# Patient Record
Sex: MALE | Race: WHITE | NOT HISPANIC OR LATINO | Employment: FULL TIME | URBAN - METROPOLITAN AREA
[De-identification: names, ages, dates, MRNs, and addresses within clinical notes are randomized per-mention and may not be internally consistent; named-entity substitution may affect disease eponyms.]

---

## 2021-05-10 ENCOUNTER — OFFICE VISIT (OUTPATIENT)
Dept: FAMILY MEDICINE | Facility: CLINIC | Age: 31
End: 2021-05-10
Payer: COMMERCIAL

## 2021-05-10 VITALS
BODY MASS INDEX: 26.66 KG/M2 | HEIGHT: 73 IN | SYSTOLIC BLOOD PRESSURE: 118 MMHG | WEIGHT: 201.12 LBS | DIASTOLIC BLOOD PRESSURE: 72 MMHG | HEART RATE: 66 BPM | RESPIRATION RATE: 16 BRPM | TEMPERATURE: 99 F

## 2021-05-10 DIAGNOSIS — Z11.59 ENCOUNTER FOR HEPATITIS C SCREENING TEST FOR LOW RISK PATIENT: ICD-10-CM

## 2021-05-10 DIAGNOSIS — Z13.6 SCREENING FOR CARDIOVASCULAR CONDITION: ICD-10-CM

## 2021-05-10 DIAGNOSIS — J30.2 SEASONAL ALLERGIC RHINITIS, UNSPECIFIED TRIGGER: ICD-10-CM

## 2021-05-10 DIAGNOSIS — Z00.00 ROUTINE GENERAL MEDICAL EXAMINATION AT A HEALTH CARE FACILITY: Primary | ICD-10-CM

## 2021-05-10 DIAGNOSIS — K21.9 GASTROESOPHAGEAL REFLUX DISEASE, UNSPECIFIED WHETHER ESOPHAGITIS PRESENT: ICD-10-CM

## 2021-05-10 DIAGNOSIS — Z11.4 SCREENING FOR HIV WITHOUT PRESENCE OF RISK FACTORS: ICD-10-CM

## 2021-05-10 DIAGNOSIS — Z13.1 SCREENING FOR DIABETES MELLITUS: ICD-10-CM

## 2021-05-10 PROCEDURE — 99385 PREV VISIT NEW AGE 18-39: CPT | Performed by: FAMILY MEDICINE

## 2021-05-10 RX ORDER — LORATADINE 10 MG/1
10 TABLET ORAL DAILY
Refills: 0 | COMMUNITY
Start: 2021-05-10 | End: 2024-02-28

## 2021-05-10 RX ORDER — FAMOTIDINE 20 MG/1
20 TABLET, FILM COATED ORAL DAILY PRN
COMMUNITY
Start: 2021-05-10

## 2021-05-10 SDOH — HEALTH STABILITY: MENTAL HEALTH: HOW OFTEN DO YOU HAVE 6 OR MORE DRINKS ON ONE OCCASION?: NEVER

## 2021-05-10 SDOH — HEALTH STABILITY: MENTAL HEALTH: HOW MANY STANDARD DRINKS CONTAINING ALCOHOL DO YOU HAVE ON A TYPICAL DAY?: 1 OR 2

## 2021-05-10 SDOH — HEALTH STABILITY: MENTAL HEALTH: HOW OFTEN DO YOU HAVE A DRINK CONTAINING ALCOHOL?: 2-4 TIMES A MONTH

## 2021-05-10 ASSESSMENT — ENCOUNTER SYMPTOMS
DIZZINESS: 0
SHORTNESS OF BREATH: 1
SORE THROAT: 0
COUGH: 1
HEARTBURN: 0
HEADACHES: 0
DIARRHEA: 0
DYSURIA: 0
FEVER: 0
NERVOUS/ANXIOUS: 0
WEAKNESS: 0
FREQUENCY: 0
MYALGIAS: 0
PALPITATIONS: 0
CONSTIPATION: 0
EYE PAIN: 0
NAUSEA: 0
HEMATOCHEZIA: 0
PARESTHESIAS: 0
JOINT SWELLING: 0
CHILLS: 0
ARTHRALGIAS: 0
HEMATURIA: 0
ABDOMINAL PAIN: 0

## 2021-05-10 ASSESSMENT — MIFFLIN-ST. JEOR: SCORE: 1918.21

## 2021-05-10 NOTE — PROGRESS NOTES
Physical-States he has had a cough outside since spring started.   Answers for HPI/ROS submitted by the patient on 5/10/2021   Annual Exam:  Frequency of exercise:: 2-3 days/week  Getting at least 3 servings of Calcium per day:: Yes  Diet:: Vegetarian/vegan  Taking medications regularly:: Yes  Medication side effects:: Not applicable, None  Bi-annual eye exam:: Yes  Dental care twice a year:: Yes  Sleep apnea or symptoms of sleep apnea:: None  abdominal pain: No  Blood in stool: No  Blood in urine: No  chest pain: No  chills: No  congestion: Yes  constipation: No  cough: Yes  diarrhea: No  dizziness: No  ear pain: No  eye pain: No  nervous/anxious: No  fever: No  frequency: No  genital sores: No  headaches: No  hearing loss: No  heartburn: No  arthralgias: No  joint swelling: No  peripheral edema: No  mood changes: No  myalgias: No  nausea: No  dysuria: No  palpitations: No  Skin sensation changes: No  sore throat: No  urgency: No  rash: No  shortness of breath: Yes  visual disturbance: No  weakness: No  impotence: No  penile discharge: No  Additional concerns today:: Yes  Duration of exercise:: 30-45 minutes      3  SUBJECTIVE:   CC: Gerson De Jesus is an 30 year old male who presents for preventive health visit.        Patient has been advised of split billing requirements and indicates understanding: Yes  Healthy Habits:    Do you get at least three servings of calcium containing foods daily (dairy, green leafy vegetables, etc.)? yes    Amount of exercise or daily activities, outside of work: 30-45 minutes per day.    Problems taking medications regularly No    Medication side effects: No    Have you had an eye exam in the past two years? Yes - yesterday.    Do you see a dentist twice per year? yes    Do you have sleep apnea, excessive snoring or daytime drowsiness?no    Today's PHQ-2 Score:   PHQ-2 ( 1999 Pfizer) 5/10/2021   Q1: Little interest or pleasure in doing things 0   Q2: Feeling down, depressed or  hopeless 0   PHQ-2 Score 0   Q1: Little interest or pleasure in doing things Not at all   Q2: Feeling down, depressed or hopeless Not at all   PHQ-2 Score 0     Abuse: Current or Past(Physical, Sexual or Emotional)- No  Do you feel safe in your environment? Yes    Have you ever done Advance Care Planning? (For example, a Health Directive, POLST, or a discussion with a medical provider or your loved ones about your wishes): No, advance care planning information given to patient to review.  Patient declined advance care planning discussion at this time. He will prepare his own.     Social History     Tobacco Use     Smoking status: Never Smoker     Smokeless tobacco: Never Used   Substance Use Topics     Alcohol use: Yes     Frequency: 2-4 times a month     Drinks per session: 1 or 2     Binge frequency: Never     If you drink alcohol do you typically have >3 drinks per day or >7 drinks per week? No                      Last PSA: No results found for: PSA    Reviewed orders with patient. Reviewed health maintenance and updated orders accordingly - Yes       Reviewed and updated as needed this visit by clinical staff    Reviewed and updated as needed this visit by Provider    Some seasonal allergies - take loratidine.     No hospitalization or surgery.   Lives with his wife.   Regular exercise. - weights and roving machine.   Father skin cancer. Mother breast cancer.   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     ROS:  CONSTITUTIONAL: NEGATIVE for fever, chills, change in weight  INTEGUMENTARY/SKIN: NEGATIVE for worrisome rashes, moles or lesions  EYES: NEGATIVE for vision changes or irritation  ENT: NEGATIVE for ear, mouth and throat problems  RESP: NEGATIVE for significant cough or SOB  CV: NEGATIVE for chest pain, palpitations or peripheral edema  GI: NEGATIVE for nausea, abdominal pain, heartburn, or change in bowel habits   male: negative for dysuria, hematuria, decreased urinary stream, erectile dysfunction, urethral  "discharge  MUSCULOSKELETAL: NEGATIVE for significant arthralgias or myalgia  NEURO: NEGATIVE for weakness, dizziness or paresthesias  PSYCHIATRIC: NEGATIVE for changes in mood or affect    OBJECTIVE:   /72 (BP Location: Right arm, Patient Position: Chair, Cuff Size: Adult Regular)   Pulse 66   Temp 99  F (37.2  C)   Resp 16   Ht 1.842 m (6' 0.5\")   Wt 91.2 kg (201 lb 1.9 oz)   BMI 26.90 kg/m    EXAM:  GENERAL: healthy, alert and no distress  EYES: Eyes grossly normal to inspection, PERRL and conjunctivae and sclerae normal  HENT: ear canals and TM's normal, nose and mouth without ulcers or lesions  NECK: no adenopathy, no asymmetry, masses, or scars and thyroid normal to palpation  RESP: lungs clear to auscultation - no rales, rhonchi or wheezes  CV: regular rate and rhythm, normal S1 S2, no S3 or S4, no murmur, click or rub, no peripheral edema and peripheral pulses strong  ABDOMEN: soft, nontender, no hepatosplenomegaly, no masses and bowel sounds normal   (male): normal male genitalia without lesions or urethral discharge, no hernia  MS: no gross musculoskeletal defects noted, no edema  SKIN: no suspicious lesions or rashes  NEURO: Normal strength and tone, mentation intact and speech normal  PSYCH: mentation appears normal, affect normal/bright      ASSESSMENT/PLAN:   1. Routine general medical examination at a health care facility   patient left without labs and tdap- per patient waited for an hour but no follow up when I called. I apologized to him and asked him to schedule future MA only and lab only appt    2. Seasonal allergic rhinitis  Using otc. Not evaluated today.   - loratadine (CLARITIN) 10 MG tablet; Take 1 tablet (10 mg) by mouth daily  Dispense:  ; Refill: 0    3. Esophageal reflux  Using otc. Not evaluated today.   - famotidine (PEPCID) 20 MG tablet; Take 1 tablet (20 mg) by mouth daily as needed    4. Encounter for hepatitis C screening test for low risk patient     - Hepatitis C " "Screen Reflex to HCV RNA Quant and Genotype    5. Screening for HIV without presence of risk factors     - HIV Antigen Antibody Combo    6. Screening for cardiovascular condition     - Lipid panel reflex to direct LDL Fasting    7. Screening for diabetes mellitus     - Glucose    Patient has been advised of split billing requirements and indicates understanding: No  COUNSELING:  Reviewed preventive health counseling, as reflected in patient instructions  Special attention given to:        Regular exercise       Healthy diet/nutrition       Vision screening       Hearing screening    Estimated body mass index is 26.9 kg/m  as calculated from the following:    Height as of this encounter: 1.842 m (6' 0.5\").    Weight as of this encounter: 91.2 kg (201 lb 1.9 oz).    Weight management plan: Discussed healthy diet and exercise guidelines    He reports that he has never smoked. He has never used smokeless tobacco.    Counseling Resources:  ATP IV Guidelines  Pooled Cohorts Equation Calculator  FRAX Risk Assessment  ICSI Preventive Guidelines  Dietary Guidelines for Americans, 2010  USDA's MyPlate  ASA Prophylaxis  Lung CA Screening    Michael Jackson MD  [facility]  "

## 2021-05-10 NOTE — PATIENT INSTRUCTIONS
We are working hard to begin vaccinating more people against COVID-19.   Check Page[employer]ites or go to [email or web address] website to check your eligibility and to schedule vaccine appointment.   Please call [phone] to schedule your covid vaccine if you are unable to schedule it through PageBites.           Did you know?      You can schedule a video visit for follow-up appointments as well as future appointments for certain conditions.  Please see the below link.     [email or web address]    If you have not already done so,  I encourage you to sign up for [employer] ([email or web address]).  This will allow you to review your results, securely communicate with a provider, and schedule virtual visits as well.  Preventive Health Recommendations  Male Ages 26 - 39    Yearly exam:             See your health care provider every year in order to  o   Review health changes.   o   Discuss preventive care.    o   Review your medicines if your doctor has prescribed any.    You should be tested each year for STDs (sexually transmitted diseases), if you re at risk.     After age 35, talk to your provider about cholesterol testing. If you are at risk for heart disease, have your cholesterol tested at least every 5 years.     If you are at risk for diabetes, you should have a diabetes test (fasting glucose).  Shots: Get a flu shot each year. Get a tetanus shot every 10 years.     Nutrition:    Eat at least 5 servings of fruits and vegetables daily.     Eat whole-grain bread, whole-wheat pasta and brown rice instead of white grains and rice.     Get adequate Calcium and Vitamin D.     Lifestyle    Exercise for at least 150 minutes a week (30 minutes a day, 5 days a week). This will help you control your weight and prevent disease.     Limit alcohol to one drink per day.     No smoking.     Wear sunscreen to prevent skin cancer.     See your dentist every six months for an exam and cleaning.

## 2023-08-15 ENCOUNTER — NURSE TRIAGE (OUTPATIENT)
Dept: NURSING | Facility: CLINIC | Age: 33
End: 2023-08-15
Payer: COMMERCIAL

## 2023-08-15 NOTE — TELEPHONE ENCOUNTER
Triage Call:     Pt calling to report that he tested positive for COVID-19 this morning and is calling for treatment  Last night is when his sx started.     Sx: Fatigue, body aches, chills, fever, headache  T: 101.0F    Took Ibuprofen    Pt is reporting that he is having shortness of breath with walking room to room in his house that is new.    Pt states that he does not have a PCP. He plans to go to the [facility] for evaluation due to the change in his breathing.    Pt was given care advice and writer discussed infection control protocol per the CDC since he is positive for COVID-19 and plans to go to a [facility].     Reason for Disposition   MILD difficulty breathing (e.g., minimal/no SOB at rest, SOB with walking, pulse <100)    Additional Information   Negative: SEVERE difficulty breathing (e.g., struggling for each breath, speaks in single words)   Negative: Difficult to awaken or acting confused (e.g., disoriented, slurred speech)   Negative: Bluish (or gray) lips or face now   Negative: Shock suspected (e.g., cold/pale/clammy skin, too weak to stand, low BP, rapid pulse)   Negative: Sounds like a life-threatening emergency to the triager   Negative: SEVERE or constant chest pain or pressure  (Exception: Mild central chest pain, present only when coughing.)   Negative: MODERATE difficulty breathing (e.g., speaks in phrases, SOB even at rest, pulse 100-120)   Negative: Headache and stiff neck (can't touch chin to chest)   Negative: Oxygen level (e.g., pulse oximetry) 90 percent or lower   Negative: Patient sounds very sick or weak to the triager   Negative: Chest pain or pressure  (Exception: MILD central chest pain, present only when coughing)   Negative: [1] Diagnosed or suspected COVID-19 AND [2] symptoms lasting 3 or more weeks   Negative: [1] COVID-19 exposure AND [2] no symptoms   Negative: COVID-19 vaccine reaction suspected (e.g., fever, headache, muscle aches) occurring 1 to 3 days after getting vaccine    Negative: COVID-19 vaccine, questions about   Negative: [1] Lives with someone known to have influenza (flu test positive) AND [2] flu-like symptoms (e.g., cough, runny nose, sore throat, SOB; with or without fever)   Negative: [1] Adult with possible COVID-19 symptoms AND [2] triager concerned about severity of symptoms or other causes   Negative: COVID-19 and breastfeeding, questions about    Protocols used: Coronavirus (COVID-19) Diagnosed or [id number]  Pat Becerra RN  [facility] Nurse Advisor 11:27 AM 8/15/2023

## 2024-02-28 ENCOUNTER — OFFICE VISIT (OUTPATIENT)
Dept: FAMILY MEDICINE | Facility: CLINIC | Age: 34
End: 2024-02-28
Payer: COMMERCIAL

## 2024-02-28 VITALS
DIASTOLIC BLOOD PRESSURE: 72 MMHG | WEIGHT: 196 LBS | OXYGEN SATURATION: 97 % | HEIGHT: 72 IN | RESPIRATION RATE: 16 BRPM | HEART RATE: 83 BPM | TEMPERATURE: 98.1 F | SYSTOLIC BLOOD PRESSURE: 110 MMHG | BODY MASS INDEX: 26.55 KG/M2

## 2024-02-28 DIAGNOSIS — Z23 ENCOUNTER FOR IMMUNIZATION: ICD-10-CM

## 2024-02-28 DIAGNOSIS — R05.3 CHRONIC COUGH: ICD-10-CM

## 2024-02-28 DIAGNOSIS — Z00.00 ROUTINE GENERAL MEDICAL EXAMINATION AT A HEALTH CARE FACILITY: Primary | ICD-10-CM

## 2024-02-28 LAB
ERYTHROCYTE [DISTWIDTH] IN BLOOD BY AUTOMATED COUNT: 11.8 % (ref 10–15)
HCT VFR BLD AUTO: 46.3 % (ref 40–53)
HGB BLD-MCNC: 16.4 G/DL (ref 13.3–17.7)
MCH RBC QN AUTO: 29.5 PG (ref 26.5–33)
MCHC RBC AUTO-ENTMCNC: 35.4 G/DL (ref 31.5–36.5)
MCV RBC AUTO: 83 FL (ref 78–100)
PLATELET # BLD AUTO: 255 10E3/UL (ref 150–450)
RBC # BLD AUTO: 5.55 10E6/UL (ref 4.4–5.9)
WBC # BLD AUTO: 5 10E3/UL (ref 4–11)

## 2024-02-28 PROCEDURE — 99395 PREV VISIT EST AGE 18-39: CPT | Mod: 25 | Performed by: PHYSICIAN ASSISTANT

## 2024-02-28 PROCEDURE — 80061 LIPID PANEL: CPT | Performed by: PHYSICIAN ASSISTANT

## 2024-02-28 PROCEDURE — 99213 OFFICE O/P EST LOW 20 MIN: CPT | Mod: 25 | Performed by: PHYSICIAN ASSISTANT

## 2024-02-28 PROCEDURE — 90715 TDAP VACCINE 7 YRS/> IM: CPT | Performed by: PHYSICIAN ASSISTANT

## 2024-02-28 PROCEDURE — 36415 COLL VENOUS BLD VENIPUNCTURE: CPT | Performed by: PHYSICIAN ASSISTANT

## 2024-02-28 PROCEDURE — 90471 IMMUNIZATION ADMIN: CPT | Performed by: PHYSICIAN ASSISTANT

## 2024-02-28 PROCEDURE — 80048 BASIC METABOLIC PNL TOTAL CA: CPT | Performed by: PHYSICIAN ASSISTANT

## 2024-02-28 PROCEDURE — 85027 COMPLETE CBC AUTOMATED: CPT | Performed by: PHYSICIAN ASSISTANT

## 2024-02-28 RX ORDER — OMEPRAZOLE 10 MG/1
20 CAPSULE, DELAYED RELEASE ORAL DAILY
COMMUNITY

## 2024-02-28 RX ORDER — CALCIUM CARBONATE 500 MG/1
500 TABLET, CHEWABLE ORAL DAILY
COMMUNITY

## 2024-02-28 SDOH — HEALTH STABILITY: PHYSICAL HEALTH: ON AVERAGE, HOW MANY DAYS PER WEEK DO YOU ENGAGE IN MODERATE TO STRENUOUS EXERCISE (LIKE A BRISK WALK)?: 7 DAYS

## 2024-02-28 ASSESSMENT — PATIENT HEALTH QUESTIONNAIRE - PHQ9
10. IF YOU CHECKED OFF ANY PROBLEMS, HOW DIFFICULT HAVE THESE PROBLEMS MADE IT FOR YOU TO DO YOUR WORK, TAKE CARE OF THINGS AT HOME, OR GET ALONG WITH OTHER PEOPLE: SOMEWHAT DIFFICULT
SUM OF ALL RESPONSES TO PHQ QUESTIONS 1-9: 10
SUM OF ALL RESPONSES TO PHQ QUESTIONS 1-9: 10

## 2024-02-28 ASSESSMENT — SOCIAL DETERMINANTS OF HEALTH (SDOH): HOW OFTEN DO YOU GET TOGETHER WITH FRIENDS OR RELATIVES?: ONCE A WEEK

## 2024-02-28 NOTE — NURSING NOTE
Prior to immunization administration, verified patients identity using patient s name and date of birth. Please see Immunization Activity for additional information.     Screening Questionnaire for Adult Immunization    Are you sick today?   No   Do you have allergies to medications, food, a vaccine component or latex?   No   Have you ever had a serious reaction after receiving a vaccination?   No   Do you have a long-term health problem with heart, lung, kidney, or metabolic disease (e.g., diabetes), asthma, a blood disorder, no spleen, complement component deficiency, a cochlear implant, or a spinal fluid leak?  Are you on long-term aspirin therapy?   No   Do you have cancer, leukemia, HIV/AIDS, or any other immune system problem?   No   Do you have a parent, brother, or sister with an immune system problem?   Yes   In the past 3 months, have you taken medications that affect  your immune system, such as prednisone, other steroids, or anticancer drugs; drugs for the treatment of rheumatoid arthritis, Crohn s disease, or psoriasis; or have you had radiation treatments?   No   Have you had a seizure, or a brain or other nervous system problem?   No   During the past year, have you received a transfusion of blood or blood    products, or been given immune (gamma) globulin or antiviral drug?   No   For women: Are you pregnant or is there a chance you could become       pregnant during the next month?   No   Have you received any vaccinations in the past 4 weeks?   No     Immunization questionnaire was positive for at least one answer.  Notified Jada.      Patient instructed to remain in clinic for 15 minutes afterwards, and to report any adverse reactions.     Screening performed by Breezy Zavala CMA on 2/28/2024 at 9:33 AM.

## 2024-02-28 NOTE — PROGRESS NOTES
"Preventive Care Visit  [facility]  Cara Elias PA-C, Physician Assistant - Medical  Feb 28, 2024    Assessment & Plan     Routine general medical examination at a health care facility  - REVIEW OF HEALTH MAINTENANCE PROTOCOL ORDERS  - CBC with platelets  - Basic metabolic panel  - Lipid panel reflex to direct LDL Fasting    Chronic cough - pt already trialing treatment for possible GERD, agree with this plan. He will continue omeprazole and if symptoms resolve while on tx but return after finishing 30 day course, he will reach out and I will send H2 blocker for daily maintenance. If no improvement with PPI, then he will reach out and I will send allergy tx.    Encounter for immunization  - TDAP 10-64Y (ADACEL,BOOSTRIX)      BMI  Estimated body mass index is 26.93 kg/m  as calculated from the following:    Height as of this encounter: 1.817 m (5' 11.54\").    Weight as of this encounter: 88.9 kg (196 lb).     Counseling  Appropriate preventive services were discussed with this patient, including applicable screening as appropriate for fall prevention, nutrition, physical activity, Tobacco-use cessation, weight loss and cognition.  Checklist reviewing preventive services available has been given to the patient.  Reviewed patient's diet, addressing concerns and/or questions.   The patient's PHQ-9 score is consistent with moderate depression. He was provided with information regarding depression.         Subjective   Gerson is a 33 year old, presenting for the following:  Physical        2/28/2024     8:49 AM   Additional Questions   Roomed by NYDIA KAMARA      Gerson here today for RHM    AM cough productive of clear sputum ongoing for 1 year  Notes he has stomach acid issues as well as lives with a dog - no history of allergies    Health Care Directive  Patient does not have a Health Care Directive or Living Will: Discussed advance care planning with patient; information given to patient to " review.    HPI      2/28/2024   General Health   How would you rate your overall physical health? Good   Feel stress (tense, anxious, or unable to sleep) To some extent   (!) STRESS CONCERN      2/28/2024   Nutrition   Three or more servings of calcium each day? Yes   Diet: Vegetarian/vegan    Breakfast skipped    Other   If other, please elaborate: dairy free   How many servings of fruit and vegetables per day? 4 or more   How many sweetened beverages each day? 0-1         2/28/2024   Exercise   Days per week of moderate/strenous exercise 7 days         2/28/2024   Social Factors   Frequency of gathering with friends or relatives Once a week   Worry food won't last until get money to buy more No   Food not last or not have enough money for food? No   Do you have housing?  Yes   Are you worried about losing your housing? No   Lack of transportation? No   Unable to get utilities (heat,electricity)? No         2/28/2024   Dental   Dentist two times every year? Yes         2/28/2024   TB Screening   Were you born outside of [place]?  No       Today's PHQ-9 Score:       2/28/2024     8:44 AM   PHQ-9 SCORE   PHQ-9 Total Score MyChart 10 (Moderate depression)   PHQ-9 Total Score 10         2/28/2024   Substance Use   Alcohol more than 3/day or more than 7/wk No   Do you use any other substances recreationally? (!) ALCOHOL     Social History     Tobacco Use    Smoking status: Never    Smokeless tobacco: Never   Vaping Use    Vaping Use: Never used   Substance Use Topics    Alcohol use: Yes    Drug use: Never             2/28/2024   One time HIV Screening   Previous HIV test? No         2/28/2024   STI Screening   New sexual partner(s) since last STI/HIV test? No         2/28/2024   Contraception/Family Planning   Questions about contraception or family planning No        Reviewed and updated as needed this visit by Provider   Tobacco   Meds  Problems  Med Hx  Surg Hx  Fam Hx          Objective    Exam  /72 (BP  "Location: Right arm, Patient Position: Sitting, Cuff Size: Adult Large)   Pulse 83   Temp 98.1  F (36.7  C) (Temporal)   Resp 16   Ht 1.817 m (5' 11.54\")   Wt 88.9 kg (196 lb)   SpO2 97%   BMI 26.93 kg/m     Estimated body mass index is 26.93 kg/m  as calculated from the following:    Height as of this encounter: 1.817 m (5' 11.54\").    Weight as of this encounter: 88.9 kg (196 lb).    Physical Exam  GENERAL: alert and no distress  EYES: Eyes grossly normal to inspection, PERRL and conjunctivae and sclerae normal  HENT: ear canals and TM's normal, nose and mouth without ulcers or lesions  NECK: no adenopathy, no asymmetry, masses, or scars  RESP: lungs clear to auscultation - no rales, rhonchi or wheezes  CV: regular rate and rhythm, normal S1 S2, no S3 or S4, no murmur, click or rub, no peripheral edema  ABDOMEN: soft, nontender, no hepatosplenomegaly, no masses and bowel sounds normal  MS: no gross musculoskeletal defects noted, no edema  SKIN: no suspicious lesions or rashes  NEURO: Normal strength and tone, mentation intact and speech normal  PSYCH: mentation appears normal, affect normal/bright      Signed Electronically by: Cara Elias PA-C    Answers submitted by the patient for this visit:  Patient Health Questionnaire (Submitted on 2/28/2024)  If you checked off any problems, how difficult have these problems made it for you to do your work, take care of things at home, or get along with other people?: Somewhat difficult  PHQ9 TOTAL SCORE: 10    "

## 2024-02-28 NOTE — PATIENT INSTRUCTIONS
Preventive Care Advice   This is general advice given by our system to help you stay healthy. However, your care team may have specific advice just for you. Please talk to your care team about your preventive care needs.  Nutrition  Eat 5 or more servings of fruits and vegetables each day.  Try wheat bread, brown rice and whole grain pasta (instead of white bread, rice, and pasta).  Get enough calcium and vitamin D. Check the label on foods and aim for 100% of the RDA (recommended daily allowance).  Lifestyle  Exercise at least 150 minutes each week   (30 minutes a day, 5 days a week).  Do muscle strengthening activities 2 days a week. These help control your weight and prevent disease.  No smoking.  Wear sunscreen to prevent skin cancer.  Have a dental exam and cleaning every 6 months.  Yearly exams  See your health care team every year to talk about:  Any changes in your health.  Any medicines your care team has prescribed.  Preventive care, family planning, and ways to prevent chronic diseases.  Shots (vaccines)   HPV shots (up to age 26), if you've never had them before.  Hepatitis B shots (up to age 59), if you've never had them before.  COVID-19 shot: Get this shot when it's due.  Flu shot: Get a flu shot every year.  Tetanus shot: Get a tetanus shot every 10 years.  Pneumococcal, hepatitis A, and RSV shots: Ask your care team if you need these based on your risk.  Shingles shot (for age 50 and up).  General health tests  Diabetes screening:  Starting at age 35, Get screened for diabetes at least every 3 years.  If you are younger than age 35, ask your care team if you should be screened for diabetes.  Cholesterol test: At age 39, start having a cholesterol test every 5 years, or more often if advised.  Bone density scan (DEXA): At age 50, ask your care team if you should have this scan for osteoporosis (brittle bones).  Hepatitis C: Get tested at least once in your life.  STIs (sexually transmitted  infections)  Before age 24: Ask your care team if you should be screened for STIs.  After age 24: Get screened for STIs if you're at risk. You are at risk for STIs (including HIV) if:  You are sexually active with more than one person.  You don't use condoms every time.  You or a partner was diagnosed with a sexually transmitted infection.  If you are at risk for HIV, ask about PrEP medicine to prevent HIV.  Get tested for HIV at least once in your life, whether you are at risk for HIV or not.  Cancer screening tests  Cervical cancer screening: If you have a cervix, begin getting regular cervical cancer screening tests at age 21. Most people who have regular screenings with normal results can stop after age 65. Talk about this with your provider.  Breast cancer scan (mammogram): If you've ever had breasts, begin having regular mammograms starting at age 40. This is a scan to check for breast cancer.  Colon cancer screening: It is important to start screening for colon cancer at age 45.  Have a colonoscopy test every 10 years (or more often if you're at risk) Or, ask your provider about stool tests like a FIT test every year or Cologuard test every 3 years.  To learn more about your testing options, visit: https://www.Azimuth/995062.pdf.  For help making a decision, visit: https://bit.ly/pc22278.  Prostate cancer screening test: If you have a prostate and are age 55 to 69, ask your provider if you would benefit from a yearly prostate cancer screening test.  Lung cancer screening: If you are a current or former smoker age 50 to 80, ask your care team if ongoing lung cancer screenings are right for you.  For informational purposes only. Not to replace the advice of your health care provider. Copyright   2023 [employer]. All rights reserved. Clinically reviewed by the [facility]. [employer] 031044 - REV 01/24.    Learning About Stress  What is stress?     Stress is your  body's response to a hard situation. Your body can have a physical, emotional, or mental response. Stress is a fact of life for most people, and it affects everyone differently. What causes stress for you may not be stressful for someone else.  A lot of things can cause stress. You may feel stress when you go on a job interview, take a test, or run a race. This kind of short-term stress is normal and even useful. It can help you if you need to work hard or react quickly. For example, stress can help you finish an important job on time.  Long-term stress is caused by ongoing stressful situations or events. Examples of long-term stress include long-term health problems, ongoing problems at work, or conflicts in your family. Long-term stress can harm your health.  How does stress affect your health?  When you are stressed, your body responds as though you are in danger. It makes hormones that speed up your heart, make you breathe faster, and give you a burst of energy. This is called the fight-or-flight stress response. If the stress is over quickly, your body goes back to normal and no harm is done.  But if stress happens too often or lasts too long, it can have bad effects. Long-term stress can make you more likely to get sick, and it can make symptoms of some diseases worse. If you tense up when you are stressed, you may develop neck, shoulder, or low back pain. Stress is linked to high blood pressure and heart disease.  Stress also harms your emotional health. It can make you sainz, tense, or depressed. Your relationships may suffer, and you may not do well at work or school.  What can you do to manage stress?  You can try these things to help manage stress:   Do something active. Exercise or activity can help reduce stress. Walking is a great way to get started. Even everyday activities such as housecleaning or yard work can help.  Try yoga or kelvin chi. These techniques combine exercise and meditation. You may need  some training at first to learn them.  Do something you enjoy. For example, listen to music or go to a movie. Practice your hobby or do volunteer work.  Meditate. This can help you relax, because you are not worrying about what happened before or what may happen in the future.  Do guided imagery. Imagine yourself in any setting that helps you feel calm. You can use online videos, books, or a teacher to guide you.  Do breathing exercises. For example:  From a standing position, bend forward from the waist with your knees slightly bent. Let your arms dangle close to the floor.  Breathe in slowly and deeply as you return to a standing position. Roll up slowly and lift your head last.  Hold your breath for just a few seconds in the standing position.  Breathe out slowly and bend forward from the waist.  Let your feelings out. Talk, laugh, cry, and express anger when you need to. Talking with supportive friends or family, a counselor, or a [place] leader about your feelings is a healthy way to relieve stress. Avoid discussing your feelings with people who make you feel worse.  Write. It may help to write about things that are bothering you. This helps you find out how much stress you feel and what is causing it. When you know this, you can find better ways to cope.  What can you do to prevent stress?  You might try some of these things to help prevent stress:  Manage your time. This helps you find time to do the things you want and need to do.  Get enough sleep. Your body recovers from the stresses of the day while you are sleeping.  Get support. Your family, friends, and community can make a difference in how you experience stress.  Limit your news feed. Avoid or limit time on social media or news that may make you feel stressed.  Do something active. Exercise or activity can help reduce stress. Walking is a great way to get started.  Where can you learn more?  Go to https://www.healthwise.net/patiented  Enter N032 in the  "search box to learn more about \"Learning About Stress.\"  Current as of: February 26, 2023               Content Version: 13.8    7381-3463 [employer].   Care instructions adapted under license by your healthcare professional. If you have questions about a medical condition or this instruction, always ask your healthcare professional. [employer] disclaims any warranty or liability for your use of this information.      Learning About Depression Screening  What is depression screening?  Depression screening is a way to see if you have depression symptoms. It may be done by a doctor or counselor. It's often part of a routine checkup. That's because your mental health is just as important as your physical health.  Depression is a mental health condition that affects how you feel, think, and act. You may:  Have less energy.  Lose interest in your daily activities.  Feel sad and grouchy for a long time.  Depression is very common. It affects people of all ages.  Many things can lead to depression. Some people become depressed after they have a stroke or find out they have a major illness like cancer or heart disease. The death of a loved one or a breakup may lead to depression. It can run in families. Most experts believe that a combination of inherited genes and stressful life events can cause it.  What happens during screening?  You may be asked to fill out a form about your depression symptoms. You and the doctor will discuss your answers. The doctor may ask you more questions to learn more about how you think, act, and feel.  What happens after screening?  If you have symptoms of depression, your doctor will talk to you about your options.  Doctors usually treat depression with medicines or counseling. Often, combining the two works best. Many people don't get help because they think that they'll get over the depression on their own. But people with depression may not get better unless they " "get treatment.  The cause of depression is not well understood. There may be many factors involved. But if you have depression, it's not your fault.  A serious symptom of depression is thinking about death or suicide. If you or someone you care about talks about this or about feeling hopeless, get help right away.  It's important to know that depression can be treated. Medicine, counseling, and self-care may help.  Where can you learn more?  Go to https://www.[employer]net/patiented  Enter T185 in the search box to learn more about \"Learning About Depression Screening.\"  Current as of: June 25, 2023               Content Version: 13.8    7591-4269 [employer].   Care instructions adapted under license by your healthcare professional. If you have questions about a medical condition or this instruction, always ask your healthcare professional. [employer] disclaims any warranty or liability for your use of this information.      Substance Use Disorder: Care Instructions  Overview     You can improve your life and health by stopping your use of alcohol or drugs. When you don't drink or use drugs, you may feel and sleep better. You may get along better with your family, friends, and coworkers. There are medicines and programs that can help with substance use disorder.  How can you care for yourself at home?  Here are some ways to help you stay sober and prevent relapse.  If you have been given medicine to help keep you sober or reduce your cravings, be sure to take it exactly as prescribed.  Talk to your doctor about programs that can help you stop using drugs or drinking alcohol.  Do not keep alcohol or drugs in your home.  Plan ahead. Think about what you'll say if other people ask you to drink or use drugs. Try not to spend time with people who drink or use drugs.  Use the time and money spent on drinking or drugs to do something that's important to you.  Preventing a relapse  Have a plan " to deal with relapse. Learn to recognize changes in your thinking that lead you to drink or use drugs. Get help before you start to drink or use drugs again.  Try to stay away from situations, friends, or places that may lead you to drink or use drugs.  If you feel the need to drink alcohol or use drugs again, seek help right away. Call a trusted friend or family member. Some people get support from organizations such as Narcotics Anonymous or [employer] or from treatment facilities.  If you relapse, get help as soon as you can. Some people make a plan with another person that outlines what they want that person to do for them if they relapse. The plan usually includes how to handle the relapse and who to notify in case of relapse.  Don't give up. Remember that a relapse doesn't mean that you have failed. Use the experience to learn the triggers that lead you to drink or use drugs. Then quit again. Recovery is a lifelong process. Many people have several relapses before they are able to quit for good.  Follow-up care is a key part of your treatment and safety. Be sure to make and go to all appointments, and call your doctor if you are having problems. It's also a good idea to know your test results and keep a list of the medicines you take.  When should you call for help?   Call 911  anytime you think you may need emergency care. For example, call if you or someone else:    Has overdosed or has withdrawal signs. Be sure to tell the emergency workers that you are or someone else is using or trying to quit using drugs. Overdose or withdrawal signs may include:  Losing consciousness.  Seizure.  Seeing or hearing things that aren't there (hallucinations).     Is thinking or talking about suicide or harming others.   Where to get help 24 hours a day, 7 days a week   If you or someone you know talks about suicide, self-harm, a mental health crisis, a substance use crisis, or any other kind of emotional distress, get  "help right away. You can:    Call the Suicide and Crisis Lifeline at 988.     Call 8-950-301-IUPQ (1-128.386.6087).     Text HOME to 467439 to access the Crisis Text Line.   Consider saving these numbers in your phone.  Go to [employer].Hublished for more information or to chat online.  Call your doctor now or seek immediate medical care if:    You are having withdrawal symptoms. These may include nausea or vomiting, sweating, shakiness, and anxiety.   Watch closely for changes in your health, and be sure to contact your doctor if:    You have a relapse.     You need more help or support to stop.   Where can you learn more?  Go to https://www.Vyu.net/patiented  Enter H573 in the search box to learn more about \"Substance Use Disorder: Care Instructions.\"  Current as of: March 21, 2023               Content Version: 13.8    3569-9839 [employer].   Care instructions adapted under license by your healthcare professional. If you have questions about a medical condition or this instruction, always ask your healthcare professional. Healthwise, [employer] disclaims any warranty or liability for your use of this information.      "

## 2024-02-29 LAB
ANION GAP SERPL CALCULATED.3IONS-SCNC: 9 MMOL/L (ref 7–15)
BUN SERPL-MCNC: 13.8 MG/DL (ref 6–20)
CALCIUM SERPL-MCNC: 9.8 MG/DL (ref 8.6–10)
CHLORIDE SERPL-SCNC: 104 MMOL/L (ref 98–107)
CHOLEST SERPL-MCNC: 210 MG/DL
CREAT SERPL-MCNC: 0.96 MG/DL (ref 0.67–1.17)
DEPRECATED HCO3 PLAS-SCNC: 28 MMOL/L (ref 22–29)
EGFRCR SERPLBLD CKD-EPI 2021: >90 ML/MIN/1.73M2
FASTING STATUS PATIENT QL REPORTED: YES
GLUCOSE SERPL-MCNC: 100 MG/DL (ref 70–99)
HDLC SERPL-MCNC: 34 MG/DL
LDLC SERPL CALC-MCNC: 144 MG/DL
NONHDLC SERPL-MCNC: 176 MG/DL
POTASSIUM SERPL-SCNC: 4.3 MMOL/L (ref 3.4–5.3)
SODIUM SERPL-SCNC: 141 MMOL/L (ref 135–145)
TRIGL SERPL-MCNC: 159 MG/DL

## 2024-05-12 ENCOUNTER — MYC MEDICAL ADVICE (OUTPATIENT)
Dept: FAMILY MEDICINE | Facility: CLINIC | Age: 34
End: 2024-05-12
Payer: COMMERCIAL

## 2024-05-12 DIAGNOSIS — K21.00 GASTROESOPHAGEAL REFLUX DISEASE WITH ESOPHAGITIS WITHOUT HEMORRHAGE: Primary | ICD-10-CM

## 2024-05-13 RX ORDER — OMEPRAZOLE 10 MG/1
20 CAPSULE, DELAYED RELEASE ORAL DAILY
Status: CANCELLED | OUTPATIENT
Start: 2024-05-13

## 2024-05-15 RX ORDER — FAMOTIDINE 20 MG/1
20 TABLET, FILM COATED ORAL 2 TIMES DAILY
Qty: 180 TABLET | Refills: 1 | Status: SHIPPED | OUTPATIENT
Start: 2024-05-15

## 2024-11-13 DIAGNOSIS — K21.00 GASTROESOPHAGEAL REFLUX DISEASE WITH ESOPHAGITIS WITHOUT HEMORRHAGE: ICD-10-CM

## 2024-11-13 RX ORDER — FAMOTIDINE 20 MG/1
20 TABLET, FILM COATED ORAL 2 TIMES DAILY
Qty: 180 TABLET | Refills: 0 | Status: SHIPPED | OUTPATIENT
Start: 2024-11-13

## 2025-02-18 DIAGNOSIS — K21.00 GASTROESOPHAGEAL REFLUX DISEASE WITH ESOPHAGITIS WITHOUT HEMORRHAGE: ICD-10-CM

## 2025-02-19 RX ORDER — FAMOTIDINE 20 MG/1
20 TABLET, FILM COATED ORAL 2 TIMES DAILY
Qty: 180 TABLET | Refills: 0 | Status: SHIPPED | OUTPATIENT
Start: 2025-02-19

## 2025-03-14 ENCOUNTER — OFFICE VISIT (OUTPATIENT)
Dept: FAMILY MEDICINE | Facility: CLINIC | Age: 35
End: 2025-03-14
Payer: COMMERCIAL

## 2025-03-14 DIAGNOSIS — R07.9 ACUTE CHEST PAIN: Primary | ICD-10-CM

## 2025-03-14 PROBLEM — K21.9 GASTROESOPHAGEAL REFLUX DISEASE WITHOUT ESOPHAGITIS: Status: ACTIVE | Noted: 2025-03-14

## 2025-03-15 LAB
ALBUMIN SERPL BCG-MCNC: 4.5 G/DL (ref 3.5–5.2)
ALP SERPL-CCNC: 60 U/L (ref 40–150)
ALT SERPL W P-5'-P-CCNC: 46 U/L (ref 0–70)
ANION GAP SERPL CALCULATED.3IONS-SCNC: 14 MMOL/L (ref 7–15)
AST SERPL W P-5'-P-CCNC: 23 U/L (ref 0–45)
BILIRUB SERPL-MCNC: 0.4 MG/DL
BUN SERPL-MCNC: 13 MG/DL (ref 6–20)
CALCIUM SERPL-MCNC: ABNORMAL MG/DL
CHLORIDE SERPL-SCNC: 105 MMOL/L (ref 98–107)
CREAT SERPL-MCNC: 0.99 MG/DL (ref 0.67–1.17)
EGFRCR SERPLBLD CKD-EPI 2021: >90 ML/MIN/1.73M2
GLUCOSE SERPL-MCNC: 103 MG/DL (ref 70–99)
HCO3 SERPL-SCNC: 23 MMOL/L (ref 22–29)
Lab: NORMAL
PERFORMING LABORATORY: NORMAL
POTASSIUM SERPL-SCNC: 4.4 MMOL/L (ref 3.4–5.3)
PROT SERPL-MCNC: 6.8 G/DL (ref 6.4–8.3)
SODIUM SERPL-SCNC: 142 MMOL/L (ref 135–145)
SPECIMEN STATUS: NORMAL
TEST NAME: NORMAL
TSH SERPL DL<=0.005 MIU/L-ACNC: 2.46 UIU/ML (ref 0.3–4.2)

## 2025-03-17 LAB — MISCELLANEOUS TEST 1 (ARUP): NORMAL

## 2025-03-18 ENCOUNTER — TELEPHONE (OUTPATIENT)
Dept: GASTROENTEROLOGY | Facility: CLINIC | Age: 35
End: 2025-03-18
Payer: COMMERCIAL

## 2025-03-18 NOTE — TELEPHONE ENCOUNTER
[facility] Call Center    Phone Message    May a detailed message be left on voicemail: Yes    Reason for Call: Other: Patient is currently scheduled on 4/24, as visit type New GI Urgent. This is outside the expected timeline for this referral. Patient has been added to the waitlist.        Pt requested to schedule for after his procedure on 4/21      Action Taken: Message routed to:  Other: GI REFERRAL TRIAGE POOL     Travel Screening: Not Applicable

## 2025-03-18 NOTE — TELEPHONE ENCOUNTER
"Endoscopy Scheduling Screen    Have you had any respiratory illness or flu-like symptoms in the last 10 days?  No    What is your communication preference for Instructions and/or Bowel Prep?   MyChart    What insurance is in the chart?  Other:  hp/bcbs     Ordering/Referring Provider:     ESSENCE SULLIVAN       (If ordering provider performs procedure, schedule with ordering provider unless otherwise instructed. )    BMI: Estimated body mass index is 27.67 kg/m  as calculated from the following:    Height as of 3/14/25: 1.829 m (6').    Weight as of 3/14/25: 92.5 kg (204 lb).     Sedation Ordered  moderate sedation.   If patient BMI > 50 do not schedule in [facility].    If patient BMI > 45 do not schedule at [facility].    Are you taking methadone or Suboxone?  NO, No RN review required.    Have you been diagnosed and are being treated for severe PTSD or severe anxiety?  NO, No RN review required.    Are you taking any prescription medications for pain 3 or more times per week?   NO, No RN review required.    Do you have a history of malignant hyperthermia?  No    (Females) Are you currently pregnant?   No     Have you been diagnosed or told you have pulmonary hypertension?   No    Do you have an LVAD?  No    Have you been told you have moderate to severe sleep apnea?  No.    Have you been told you have COPD, asthma, or any other lung disease?  No    Has your doctor ordered any cardiac tests like echo, angiogram, stress test, ablation, or EKG, that you have not completed yet?  No    Do you  have a history of any heart conditions?  No     Have you ever had or are you waiting for an organ transplant?  No. Continue scheduling, no site restrictions.    Have you had a stroke or transient ischemic attack (TIA aka \"mini stroke\") in the last 2 years?   No.    Have you been diagnosed with or been told you have cirrhosis of the liver?   No.    Are you currently on dialysis?   No    Do you need assistance transferring?   No    BMI: " Estimated body mass index is 27.67 kg/m  as calculated from the following:    Height as of 3/14/25: 1.829 m (6').    Weight as of 3/14/25: 92.5 kg (204 lb).     Is patients BMI > 40 and scheduling location UP?  No    Do you take an injectable or oral medication for weight loss or diabetes (excluding insulin)?  No    Do you take the medication Naltrexone?  No    Do you take blood thinners?  No       Prep   Are you currently on dialysis or do you have chronic kidney disease?  No    Do you have a diagnosis of diabetes?  No    Do you have a diagnosis of cystic fibrosis (CF)?  No    On a regular basis do you go 3 -5 days between bowel movements?  No    BMI > 40?  No    Preferred Pharmacy:    [facility] [id number] [place] [facility] [address] [place] [address]  [address] [facility] [address]  Phone: [phone] Fax: [phone]      Final Scheduling Details     Procedure scheduled  Upper endoscopy (EGD)    Surgeon:  Shanti      Date of procedure:  04/21/2025     Pre-OP / PAC:   No - Not required for this site.    Location  [facility] - Per order.    Sedation   Moderate Sedation - Per order.      Patient Reminders:   You will receive a call from a Nurse to review instructions and health history.  This assessment must be completed prior to your procedure.  Failure to complete the Nurse assessment may result in the procedure being cancelled.      On the day of your procedure, please designate an adult(s) who can drive you home stay with you for the next 24 hours. The medicines used in the exam will make you sleepy. You will not be able to drive.      You cannot take public transportation, ride share services, or non-medical taxi service without a responsible caregiver.  Medical transport services are allowed with the requirement that a responsible caregiver will receive you at your destination.  We require that drivers and caregivers are confirmed prior to your procedure.

## 2025-03-19 NOTE — TELEPHONE ENCOUNTER
REFERRAL INFORMATION:  Referring Provider:  Shelly Norton MD   Referring Clinic:  [facility]   Reason for Visit/Diagnosis: K21.9 (ICD-10-CM) - Gastroesophageal reflux disease without esophagitis      FUTURE VISIT INFORMATION:  Appointment Date: 4/24/25     NOTES STATUS DETAILS   OFFICE NOTE from Referring Provider Internal 3/14/25   PROCEDURES     STOOL TESTING     LABS     PERTINENT LABS Internal    IMAGES

## 2025-04-06 ENCOUNTER — HEALTH MAINTENANCE LETTER (OUTPATIENT)
Age: 35
End: 2025-04-06

## 2025-04-08 ENCOUNTER — TELEPHONE (OUTPATIENT)
Dept: GASTROENTEROLOGY | Facility: CLINIC | Age: 35
End: 2025-04-08
Payer: COMMERCIAL

## 2025-04-08 NOTE — TELEPHONE ENCOUNTER
Pre assessment completed for upcoming procedure.   (Please see previous telephone encounter notes for complete details)      Procedure details:    Arrival time and facility location reviewed.    Pre op exam needed? No.    Designated  policy reviewed. Instructed to have someone stay 6  hours post procedure.   Conscious sedation reviewed with patient.     Medication review:    Medications reviewed. Please see supporting documentation below. Holding recommendations discussed (if applicable).       Prep for procedure:     Procedure prep instructions reviewed.        Any additional information needed:  N/A      Patient verbalized understanding and had no questions or concerns at this time.      Shayna Higgins RN  Endoscopy Procedure Pre Assessment   [phone] option 3

## 2025-04-08 NOTE — TELEPHONE ENCOUNTER
Side note for documentation, not necessarily needed during PA call. Patient c/o chest pain during OV with PCP. PCP attributed this to possible hiatal hernia. Did do EKG during appointment and this was normal. Some symptoms in OV sound similar to anxiety. Patient does not meet criteria for high risk of cardiac issues (ie no family history listed of such, and patient is young age).   --------------------------------------------------------------------------------------------------------------------        Pre visit planning completed.      Procedure details:    Patient scheduled for Upper endoscopy (EGD) on 4/21/25.     Arrival time: 1045. Procedure time 1145    Facility location: [facility]; [address] [place] [address] [place] [address]. Check in location: 5th Floor.    Sedation type: Conscious sedation     Pre op exam needed? No.    Indication for procedure: GERD      Chart review:     Electronic implanted devices? No    Recent diagnosis of diverticulitis within the last 6 weeks? N/A      Medication review:    Diabetic? No    Anticoagulants? No    Weight loss medication/injectable? No GLP-1 medication per patient's medication list. Nursing to verify with pre-assessment call.    Other medication HOLDING recommendations:  N/A      Prep for procedure:     Bowel prep recommendation: N/A      Procedure information and instructions sent via Bubbles         Jovita Castanon RN  Endoscopy Procedure Pre Assessment   [phone] option 3

## 2025-04-15 ENCOUNTER — TELEPHONE (OUTPATIENT)
Dept: FAMILY MEDICINE | Facility: CLINIC | Age: 35
End: 2025-04-15
Payer: COMMERCIAL

## 2025-04-15 NOTE — TELEPHONE ENCOUNTER
Received a call from patient and they need the GI referral for the endoscopy pushed through insurance portal.

## 2025-04-16 NOTE — TELEPHONE ENCOUNTER
Insurance we have on file is from 2024.  If that is current and patient is referring to his [employer] insurance, [facility] not [facility]. Patient   Will need to contact clinic listed on his card and request referral.    Cara PINEDA Referrals

## 2025-04-21 ENCOUNTER — HOSPITAL ENCOUNTER (OUTPATIENT)
Facility: AMBULATORY SURGERY CENTER | Age: 35
Discharge: HOME | End: 2025-04-21
Attending: INTERNAL MEDICINE | Admitting: INTERNAL MEDICINE
Payer: COMMERCIAL

## 2025-04-21 VITALS
DIASTOLIC BLOOD PRESSURE: 75 MMHG | BODY MASS INDEX: 27.09 KG/M2 | HEART RATE: 78 BPM | RESPIRATION RATE: 12 BRPM | SYSTOLIC BLOOD PRESSURE: 134 MMHG | HEIGHT: 72 IN | TEMPERATURE: 97.8 F | OXYGEN SATURATION: 98 % | WEIGHT: 200 LBS

## 2025-04-21 DIAGNOSIS — K21.9 GASTROESOPHAGEAL REFLUX DISEASE WITHOUT ESOPHAGITIS: Primary | ICD-10-CM

## 2025-04-21 LAB — UPPER GI ENDOSCOPY: NORMAL

## 2025-04-21 PROCEDURE — 43235 EGD DIAGNOSTIC BRUSH WASH: CPT | Performed by: INTERNAL MEDICINE

## 2025-04-21 RX ORDER — PROCHLORPERAZINE MALEATE 10 MG
10 TABLET ORAL EVERY 6 HOURS PRN
Status: DISCONTINUED | OUTPATIENT
Start: 2025-04-21 | End: 2025-04-22 | Stop reason: HOSPADM

## 2025-04-21 RX ORDER — NALOXONE HYDROCHLORIDE 0.4 MG/ML
0.2 INJECTION, SOLUTION INTRAMUSCULAR; INTRAVENOUS; SUBCUTANEOUS
Status: DISCONTINUED | OUTPATIENT
Start: 2025-04-21 | End: 2025-04-22 | Stop reason: HOSPADM

## 2025-04-21 RX ORDER — FLUMAZENIL 0.1 MG/ML
0.2 INJECTION, SOLUTION INTRAVENOUS
Status: DISCONTINUED | OUTPATIENT
Start: 2025-04-21 | End: 2025-04-22 | Stop reason: HOSPADM

## 2025-04-21 RX ORDER — ONDANSETRON 2 MG/ML
4 INJECTION INTRAMUSCULAR; INTRAVENOUS EVERY 6 HOURS PRN
Status: DISCONTINUED | OUTPATIENT
Start: 2025-04-21 | End: 2025-04-22 | Stop reason: HOSPADM

## 2025-04-21 RX ORDER — ONDANSETRON 2 MG/ML
4 INJECTION INTRAMUSCULAR; INTRAVENOUS
Status: DISCONTINUED | OUTPATIENT
Start: 2025-04-21 | End: 2025-04-22 | Stop reason: HOSPADM

## 2025-04-21 RX ORDER — ONDANSETRON 4 MG/1
4 TABLET, ORALLY DISINTEGRATING ORAL EVERY 6 HOURS PRN
Status: DISCONTINUED | OUTPATIENT
Start: 2025-04-21 | End: 2025-04-22 | Stop reason: HOSPADM

## 2025-04-21 RX ORDER — FENTANYL CITRATE 50 UG/ML
INJECTION, SOLUTION INTRAMUSCULAR; INTRAVENOUS PRN
Status: DISCONTINUED | OUTPATIENT
Start: 2025-04-21 | End: 2025-04-21 | Stop reason: HOSPADM

## 2025-04-21 RX ORDER — NALOXONE HYDROCHLORIDE 0.4 MG/ML
0.4 INJECTION, SOLUTION INTRAMUSCULAR; INTRAVENOUS; SUBCUTANEOUS
Status: DISCONTINUED | OUTPATIENT
Start: 2025-04-21 | End: 2025-04-22 | Stop reason: HOSPADM

## 2025-04-21 RX ORDER — OMEPRAZOLE 40 MG/1
40 CAPSULE, DELAYED RELEASE ORAL DAILY
Qty: 90 CAPSULE | Refills: 3 | Status: SHIPPED | OUTPATIENT
Start: 2025-04-21

## 2025-04-21 RX ORDER — LIDOCAINE 40 MG/G
CREAM TOPICAL
Status: DISCONTINUED | OUTPATIENT
Start: 2025-04-21 | End: 2025-04-22 | Stop reason: HOSPADM

## 2025-04-21 NOTE — PROGRESS NOTES
-- Order placed for omeprazole 40mg daily  -- Order placed for repeat EGD w/ MAC in 3 months with Dr. Moser  ____________________________________________  Per EGD w/ Dr. Moser 4/21/25:

## 2025-04-21 NOTE — H&P
Gerson PADILLA De Jesus  [id number]  male  34 year old      Reason for procedure/surgery: reflux    Patient Active Problem List   Diagnosis    Gastroesophageal reflux disease without esophagitis       Past Surgical History:    Past Surgical History:   Procedure Laterality Date    DENTAL SURGERY         Past Medical History: No past medical history on file.    Social History:   Social History     Tobacco Use    Smoking status: Never    Smokeless tobacco: Never   Substance Use Topics    Alcohol use: Yes       Family History:   Family History   Problem Relation Age of Onset    Breast Cancer Mother     Melanoma Mother     Melanoma Father     GERD Father     No Known Problems Sister     GERD Brother     No Known Problems Brother     No Known Problems Brother     Cancer Maternal Grandmother         chest tumor    Alzheimer Disease Maternal Grandfather     Alzheimer Disease Paternal Grandmother        Allergies: No Known Allergies    Active Medications:   Current Outpatient Medications   Medication Sig Dispense Refill    calcium carbonate (TUMS) 500 MG chewable tablet Take 500 mg by mouth daily      famotidine (PEPCID) 20 MG tablet TAKE 1 TABLET BY MOUTH TWICE A  tablet 0       Systemic Review:   CONSTITUTIONAL: NEGATIVE for fever, chills, change in weight  ENT/MOUTH: NEGATIVE for ear, mouth and throat problems  RESP: NEGATIVE for significant cough or SOB  CV: NEGATIVE for chest pain, palpitations or peripheral edema    Physical Examination:   Vital Signs: /77 (BP Location: Right arm)   Pulse 72   Temp 97.7  F (36.5  C) (Temporal)   Ht 1.829 m (6')   Wt 90.7 kg (200 lb)   SpO2 99%   BMI 27.12 kg/m    GENERAL: healthy, alert and no distress  NECK: no adenopathy, no asymmetry, masses, or scars  RESP: lungs clear to auscultation - no rales, rhonchi or wheezes  CV: regular rate and rhythm, normal S1 S2, no S3 or S4, no murmur, click or rub, no peripheral edema and peripheral pulses strong  ABDOMEN: soft,  nontender, no hepatosplenomegaly, no masses and bowel sounds normal  MS: no gross musculoskeletal defects noted, no edema    Plan: Appropriate to proceed as scheduled.      Sarah Moser MD  4/21/2025    PCP:  Shelly Norton

## 2025-04-24 ENCOUNTER — OFFICE VISIT (OUTPATIENT)
Dept: GASTROENTEROLOGY | Facility: CLINIC | Age: 35
End: 2025-04-24
Attending: FAMILY MEDICINE
Payer: COMMERCIAL

## 2025-04-24 ENCOUNTER — PRE VISIT (OUTPATIENT)
Dept: GASTROENTEROLOGY | Facility: CLINIC | Age: 35
End: 2025-04-24

## 2025-04-24 ENCOUNTER — LAB (OUTPATIENT)
Dept: LAB | Facility: CLINIC | Age: 35
End: 2025-04-24
Payer: COMMERCIAL

## 2025-04-24 VITALS
HEART RATE: 75 BPM | WEIGHT: 209.1 LBS | OXYGEN SATURATION: 98 % | DIASTOLIC BLOOD PRESSURE: 79 MMHG | HEIGHT: 72 IN | SYSTOLIC BLOOD PRESSURE: 118 MMHG | BODY MASS INDEX: 28.32 KG/M2

## 2025-04-24 DIAGNOSIS — R19.5 LOOSE STOOLS: ICD-10-CM

## 2025-04-24 DIAGNOSIS — K21.9 GASTROESOPHAGEAL REFLUX DISEASE WITHOUT ESOPHAGITIS: ICD-10-CM

## 2025-04-24 DIAGNOSIS — R19.5 LOOSE STOOLS: Primary | ICD-10-CM

## 2025-04-24 DIAGNOSIS — K20.80 LOS ANGELES GRADE C ESOPHAGITIS: ICD-10-CM

## 2025-04-24 PROCEDURE — 99000 SPECIMEN HANDLING OFFICE-LAB: CPT | Performed by: PATHOLOGY

## 2025-04-24 PROCEDURE — 86364 TISS TRNSGLTMNASE EA IG CLAS: CPT | Performed by: PHYSICIAN ASSISTANT

## 2025-04-24 PROCEDURE — 82784 ASSAY IGA/IGD/IGG/IGM EACH: CPT | Performed by: PHYSICIAN ASSISTANT

## 2025-04-24 ASSESSMENT — PAIN SCALES - GENERAL: PAINLEVEL_OUTOF10: NO PAIN (0)

## 2025-04-24 NOTE — PROGRESS NOTES
GI CLINIC VISIT    CC/REFERRING MD:  Shelly Norton  REASON FOR CONSULTATION: GERD    ASSESSMENT/PLAN:  Gerson De Jesus is a 34 year old year old male with PMHx significant for heartburn who presents seeing [facility] for GERD and loose stools. Father has brown esophagus but there is no fhx of esohageal cancer. Never smoker. Some ETOH use.     #GERD  #LA Grade C esophagitis   #dysphagia   4/2024 EGD (done on pepcid 20 mg BID) diagnostic of erosive GERD with evidence of 5 cm hiatal hernia. Stomach and duodenum both were normal.     -Discussed findings, treatment (and potential long-term PPI tx), GERD, hiatal hernia, potential transformation to BE and then potentially esophageal cancer   -Start omeprazole 40 mg daily, administration reviewed and repeat EGD on PPI in 3 mo   -OK to continue pepcid 20 mg BID, can taper off too depending on clinical sx  -OK to add on alginate given HH    -advised to avoid NSAIDs  -GERD diet/lifestyle recommendations per AVS     Plan  -start omeprazole 40 mg daily + pepcid 20 mg BID +/- alginate   -Repeat EGD on PPI in 3 mo     Future consideration  If w/u negative  1.BID PPI +/- BID H2RA. Tx also includes alternate PPI vs P-CAB  2. Consider surgical consult for medically relcitant GERD   3. VFSS to be considered   4. NMGES     #Hiatal Hernia  #GEJ disruption  Education regarding hiatal hernias and how the anatomic disruption to gastroesophageal junction (GEJ) predisposes him to acid reflux events. Does have regurgitation.     Plan  - Possible addition of alginate for regurgitation (reflux gourmet or gaviscon extra strength)    Future consideration  1. consider thoracic consult for unmanaged GERD +/- large symptomatic hernia     #Loose stools   Chronic loose stools that improved after initiation of low gluten/GFD in past 5d. Unclear if this is a coincidence or reflective of malabsorption / celiac disease or possible gluten sensitivity . No fhx of celiac dz and  duodenum looked normal on recent scope.     Plan  -gluten challenge then celiac serologies  --if Ab high, plan to ask for duodenal biopsies on gluten containing diet     Future consideration  1.If he were to develop loose stools again, will plan further work up as clinically indicated     Orders Placed This Encounter   Procedures    Helicobacter pylori Antigen Stool    Tissue transglutaminase shikha IgA and IgG    IgA    Adult GI  Referral - Procedure Only     Colorectal cancer screenin    RTC 2 wk after EGD    Thank you for this consultation.  It was a pleasure to participate in the care of this patient; please contact me with any further questions.     Michelle Silverman PA-C    Follow up: As planned above. Today, I personally spent 50  minutes spent on the date of the encounter doing chart review, history and exam, documentation and further activities per the note.    HPI  Gerson De Jesus is a 34 year old year old male with no sig PMHx following with the [facility] GI group for GERD.     1. Reflux x 18 years   -persistent sx in AM, aggravated by eating rich, sweet foods and alcohol; trying to limit  -also w/ acid brash, nocturnal coughing, heartburn, regurgitation (more in AM), some swallowing troubles but no odynphagia, post-prandial bloating   -been on famotidine BID for a year now which has helped a bit  -omeprazole was just sent but not started yet - wanted to know about taking the medicine or if it will replace H2RA   -No abd pain   Does have nausea but rarely will vomit  Having sx of regur (more in AM)     NSAIDs - once monthly if that, but not very regular at all     Father with brown esophagus but no known fhx of esophageal cancer   Reflux issues run in family (male)    2.   No trouble w/ sedation   Post prandial bloating  No abd pain in general     Bowel Habits - fairly loose  Better after avoiding/limiting gluten in past 5d  Prior to diet change in past week  - average will have 4-5 stools a day,  oatmeal --> seemed to have worsened in last 8 years but has had long-standing troubles w/ loose stools   -clustered movements, incomplete evacuation, on toilet for 1-2 min, no straining  -usually need to stool right after eating with a lot of urgency  -no bloody or black stools   -nocturnal stooling is not common for him   -No days /wo stooling     Now on a low gluten and or GFD he is having 1 stool a day, sausage log like stool, better evacuation these days     No IBD, celiac disease, CRC in his family     Weight - stable   Appetite stable     Asthma in his family. Allergy to cats.     Family Hx  Father w/ brown esophagus   No other known family history or GI related malignancy (esophageal, gastric, pancreatic, liver or colon) or family history of IBD/celiac disease.     Social Hx     occasional use of NSAIDs or Tylenol    Yes ETOH - 2 drinks a week, beer, 8 oz servings  Never tobacco products   No recreational drug use     PERTINENT PAST MEDICAL HISTORY:  No past medical history on file.    PREVIOUS SURGERIES:  Past Surgical History:   Procedure Laterality Date    DENTAL SURGERY      ESOPHAGOSCOPY, GASTROSCOPY, DUODENOSCOPY (EGD), COMBINED N/A 4/21/2025    Procedure: Esophagoscopy, gastroscopy, duodenoscopy (EGD), combined;  Surgeon: Sarah Moser MD;  Location: [facility] OR       ALLERGIES:   No Known Allergies    PERTINENT MEDICATIONS:    Current Outpatient Medications:     calcium carbonate (TUMS) 500 MG chewable tablet, Take 500 mg by mouth daily, Disp: , Rfl:     famotidine (PEPCID) 20 MG tablet, TAKE 1 TABLET BY MOUTH TWICE A DAY, Disp: 180 tablet, Rfl: 0    omeprazole (PRILOSEC) 40 MG DR capsule, Take 1 capsule (40 mg) by mouth daily., Disp: 90 capsule, Rfl: 3    SOCIAL HISTORY:  Social History     Socioeconomic History    Marital status:      Spouse name: Not on file    Number of children: Not on file    Years of education: Not on file    Highest education level: Not on file   Occupational History     Not on file   Tobacco Use    Smoking status: Never    Smokeless tobacco: Never   Vaping Use    Vaping status: Never Used   Substance and Sexual Activity    Alcohol use: Yes    Drug use: Never    Sexual activity: Yes   Other Topics Concern    Not on file   Social History Narrative    Not on file     Social Drivers of Health     Financial Resource Strain: Low Risk  (2/28/2024)    Financial Resource Strain     Within the past 12 months, have you or your family members you live with been unable to get utilities (heat, electricity) when it was really needed?: No   Food Insecurity: Low Risk  (2/28/2024)    Food Insecurity     Within the past 12 months, did you worry that your food would run out before you got money to buy more?: No     Within the past 12 months, did the food you bought just not last and you didn t have money to get more?: No   Transportation Needs: Low Risk  (2/28/2024)    Transportation Needs     Within the past 12 months, has lack of transportation kept you from medical appointments, getting your medicines, non-medical meetings or appointments, work, or from getting things that you need?: No   Physical Activity: Unknown (2/28/2024)    Exercise Vital Sign     Days of Exercise per Week: 7 days     Minutes of Exercise per Session: Not on file   Stress: Stress Concern Present (2/28/2024)    [employer] - Occupational Stress Questionnaire     Feeling of Stress : To some extent   Social Connections: Unknown (2/28/2024)    Social Connection and Isolation Panel [NHANES]     Frequency of Communication with Friends and Family: Not on file     Frequency of Social Gatherings with Friends and Family: Once a week     Attends [employer] Services: Not on file     Active Member of Clubs or Organizations: Not on file     Attends Club or Organization Meetings: Not on file     Marital Status: Not on file   Interpersonal Safety: Low Risk  (4/21/2025)    Interpersonal Safety     Do you feel  "physically and emotionally safe where you currently live?: Yes     Within the past 12 months, have you been hit, slapped, kicked or otherwise physically hurt by someone?: No     Within the past 12 months, have you been humiliated or emotionally abused in other ways by your partner or ex-partner?: No   Housing Stability: Low Risk  (2/28/2024)    Housing Stability     Do you have housing? : Yes     Are you worried about losing your housing?: No       FAMILY HISTORY:  Family History   Problem Relation Age of Onset    Breast Cancer Mother     Melanoma Mother     Melanoma Father     GERD Father     No Known Problems Sister     GERD Brother     No Known Problems Brother     No Known Problems Brother     Cancer Maternal Grandmother         chest tumor    Alzheimer Disease Maternal Grandfather     Alzheimer Disease Paternal Grandmother        Past/family/social history reviewed and no changes    PHYSICAL EXAMINATION:  Vitals reviewed: /79   Pulse 75   Ht 1.829 m (6')   Wt 94.8 kg (209 lb 1.6 oz)   SpO2 98%   BMI 28.36 kg/m    Constitutional: aaox3, cooperative, pleasant, not dyspneic/diaphoretic, no acute distress  Eyes: Sclera anicteric/injected  Ears/nose/mouth/throat: hearing intact  Neck: supple, active ROM w/o limitation or pain   CV: No edema  Respiratory: Unlabored breathing  Abd:  Nondistended, mild tenderness to epigastric region on deep palpation \"burning\", no peritoneal signs, neg Thomason's sign  Skin: warm, perfused, no jaundice  Psych: Normal affect  MSK: Normal gait     PERTINENT STUDIES:    Office Visit on 03/14/2025   Component Date Value Ref Range Status    TSH 03/14/2025 2.46  0.30 - 4.20 uIU/mL Final    Sodium 03/14/2025 142  135 - 145 mmol/L Final    Potassium 03/14/2025 4.4  3.4 - 5.3 mmol/L Final    Carbon Dioxide (CO2) 03/14/2025 23  22 - 29 mmol/L Final    Anion Gap 03/14/2025 14  7 - 15 mmol/L Final    Urea Nitrogen 03/14/2025 13.0  6.0 - 20.0 mg/dL Final    Creatinine 03/14/2025 0.99  " 0.67 - 1.17 mg/dL Final    GFR Estimate 03/14/2025 >90  >60 mL/min/1.73m2 Final    Calcium 03/14/2025    Final    Chloride 03/14/2025 105  98 - 107 mmol/L Final    Glucose 03/14/2025 103 (H)  70 - 99 mg/dL Final    Alkaline Phosphatase 03/14/2025 60  40 - 150 U/L Final    AST 03/14/2025 23  0 - 45 U/L Final    ALT 03/14/2025 46  0 - 70 U/L Final    Protein Total 03/14/2025 6.8  6.4 - 8.3 g/dL Final    Albumin 03/14/2025 4.5  3.5 - 5.2 g/dL Final    Bilirubin Total 03/14/2025 0.4  <=1.2 mg/dL Final    WBC Count 03/14/2025 5.3  4.0 - 11.0 10e3/uL Final    RBC Count 03/14/2025 5.45  4.40 - 5.90 10e6/uL Final    Hemoglobin 03/14/2025 16.3  13.3 - 17.7 g/dL Final    Hematocrit 03/14/2025 45.5  40.0 - 53.0 % Final    MCV 03/14/2025 84  78 - 100 fL Final    MCH 03/14/2025 29.9  26.5 - 33.0 pg Final    MCHC 03/14/2025 35.8  31.5 - 36.5 g/dL Final    RDW 03/14/2025 11.4  10.0 - 15.0 % Final    Platelet Count 03/14/2025 257  150 - 450 10e3/uL Final    % Neutrophils 03/14/2025 45  % Final    % Lymphocytes 03/14/2025 44  % Final    % Monocytes 03/14/2025 8  % Final    % Eosinophils 03/14/2025 2  % Final    % Basophils 03/14/2025 1  % Final    % Immature Granulocytes 03/14/2025 0  % Final    Absolute Neutrophils 03/14/2025 2.4  1.6 - 8.3 10e3/uL Final    Absolute Lymphocytes 03/14/2025 2.3  0.8 - 5.3 10e3/uL Final    Absolute Monocytes 03/14/2025 0.5  0.0 - 1.3 10e3/uL Final    Absolute Eosinophils 03/14/2025 0.1  0.0 - 0.7 10e3/uL Final    Absolute Basophils 03/14/2025 0.1  0.0 - 0.2 10e3/uL Final    Absolute Immature Granulocytes 03/14/2025 0.0  <=0.4 10e3/uL Final    Specimen Status 03/14/2025 Specimen received. Reordered and sent to performing laboratory. Report to follow upon completion.   Final    Performing Laboratory 03/14/2025 [facility]   Final    Test Name 03/14/2025 Calcium, Serum or Plasma   Final    Test Code 03/14/2025 [id number]   Final    Miscellaneous Test 03/14/2025 SEE NOTE   Final       Last Comprehensive Metabolic  Panel:  Lab Results   Component Value Date     03/14/2025    POTASSIUM 4.4 03/14/2025    CHLORIDE 105 03/14/2025    CO2 23 03/14/2025    ANIONGAP 14 03/14/2025     (H) 03/14/2025    BUN 13.0 03/14/2025    CR 0.99 03/14/2025    GFRESTIMATED >90 03/14/2025    SUMMER  03/14/2025      Comment:      Assay at [facility] unavailable, Calcium reordered to Reference Lab.          TSH   Date Value Ref Range Status   03/14/2025 2.46 0.30 - 4.20 uIU/mL Final        PREVIOUS ENDOSCOPY    No results found for this or any previous visit.  Upper GI Endoscopy 04/21/2025 12:13 PM [facility] [address] [place] [address] [phone]     Endoscopy Department  _______________________________________________________________________________  Patient Name: Gerson Munoz         Procedure Date: 4/21/2025 12:13 PM  MRN: [id number]                       Account Number: [id number]  YOB: 1990              Admit Type: Outpatient  Age: 34                               [facility]   Gender: Male                          Note Status: Finalized  Attending MD: SARIKA HOFFMAN MD,    Pause for the Cause: performed.  Total Sedation Time: 11 minutes of continuous 1:1 bedside monitoring performed.  _______________________________________________________________________________     Procedure:             Upper GI endoscopy  Indications:           Heartburn  Providers:             SARIKA HOFFMAN MD, Maritza Brice RN, Shayna Gann RN, [place]oda [place], Technologist  Referring MD:          ESSENCE SULLIVAN MD  Medicines:             Midazolam 5 mg IV, Fentanyl 100 micrograms IV,                         Benzocaine spray  Complications:         No immediate complications.  _______________________________________________________________________________  Procedure:             Pre-Anesthesia Assessment:                         - Prior to the  procedure, a History and Physical was                         performed, and patient medications and allergies were                         reviewed. The patient is competent. The risks and                         benefits of the procedure and the sedation options and                         risks were discussed with the patient. All questions                         were answered and informed consent was obtained.                         Patient identification and proposed procedure were                         verified by the physician, the nurse and the                         technician in the pre-procedure area in the procedure                         room. Mental Status Examination: alert and oriented.                         Airway Examination: normal oropharyngeal airway and                         neck mobility. Respiratory Examination: clear to                         auscultation. CV Examination: normal. Prophylactic                         Antibiotics: The patient does not require prophylactic                         antibiotics. Prior Anticoagulants: The patient has                         taken no anticoagulant or antiplatelet agents. ASA                         Grade Assessment: I - A normal, healthy patient. After                         reviewing the risks and benefits, the patient was                         deemed in satisfactory condition to undergo the                         procedure. The anesthesia plan was to use moderate                         sedation / analgesia (conscious sedation). Immediately                         prior to administration of medications, the patient                         was re-assessed for adequacy to receive sedatives. The                         heart rate, respiratory rate, oxygen saturations,                         blood pressure, adequacy of pulmonary ventilation, and                         response to care were monitored throughout the                          procedure. The physical status of the patient was                         re-assessed after the procedure.                         After obtaining informed consent, the endoscope was                         passed under direct vision. Throughout the procedure,                         the patient's blood pressure, pulse, and oxygen                         saturations were monitored continuously. The Endoscope                         was introduced through the mouth, and advanced to the                         second part of duodenum. The upper GI endoscopy was                         somewhat difficult due to the patient's discomfort                         during the procedure. The patient tolerated the                         procedure fairly well.                                                                                   Findings:       The gastroesophageal flap valve was visualized endoscopically and       classified as Hill Grade IV (no fold, wide open lumen, hiatal hernia       present).       A 5 cm hiatal hernia was present.       LA Grade C (one or more mucosal breaks continuous between tops of 2 or       more mucosal folds, less than 75% circumference) esophagitis with no       bleeding was found 37 cm from the incisors.       The entire examined stomach was normal.       The duodenal bulb, first portion of the duodenum and second portion of       the duodenum were normal.                                                                                   Impression:            - Gastroesophageal flap valve classified as Hill Grade                         IV (no fold, wide open lumen, hiatal hernia present).                         - 5 cm hiatal hernia.                         - LA Grade C reflux esophagitis with no bleeding.                         - Normal stomach.                         - Normal duodenal bulb, first portion of the duodenum                         and second portion of the  duodenum.                         - No specimens collected.  Recommendation:        - Discharge patient to home (ambulatory).                         - Resume previous diet.                         - Continue present medications.                         - The findings and recommendations were discussed with                         the patient.                         - Use Prilosec (omeprazole) 40 mg PO daily.                         - Repeat upper endoscopy in 3 months to check healing                         under MAC.                                                                                     Electronically signed by: Sarah Moser MD  __________________

## 2025-04-24 NOTE — NURSING NOTE
Do you have a history of colon cancer in your immediate family? NO    If yes who: negative     And what age  were they diagnosed:       Chief Complaint   Patient presents with    New Patient       Vitals:    04/24/25 0908   BP: 118/79   Pulse: 75   SpO2: 98%   Weight: 94.8 kg (209 lb 1.6 oz)   Height: 1.829 m (6')       Body mass index is 28.36 kg/m .    Derek Bean MA

## 2025-04-24 NOTE — PATIENT INSTRUCTIONS
It was a pleasure taking care of you today.  I've included a brief summary of our discussion and care plan from today's visit below.  Please review this information with your primary care provider.  _______________________________________________________________________    My recommendations are summarized as follows:    Gluten challenge - a piece of bread daily x 14 days then get labs to check for celiac disease. You can get this done at any [facility] location  Obtain h.pylori stool test after you leave the sample, OK to start omeprazole   Start the omeprazole daily - take on empty stomach 30-60 min before first meal of the day   Continue the famotidine as well, you may be able to wean the use of famotidine depending on the response to the daily PPI   You can consider adding alginate (reflux gourmet or gaviscon extra strength) to regiment to help with regurgitation given the hernia   Repeat upper endoscopy on the medications in about 3 months, please schedule   Follow up with me 2 weeks after scope      Return to GI Clinic in 3-6 months to review your progress.     Please see below for any additional questions and scheduling guidelines.    Sign up for [employer]: [employer] patient portal serves as a secure platform for accessing your medical records from the [facility]. Additionally, [employer] facilitates easy, timely, and secure messaging with your care team. If you have not signed up, you may do so by using the provided code or calling [phone].    Coordinating your care after your visit:  There are multiple options for scheduling your follow-up care based on your provider's recommendation.    How do I schedule a follow-up clinic appointment:   After your appointment, you may receive scheduling assistance with the Clinic Coordinators by having a seat in the waiting room and a Clinic Coordinator will call you up to schedule.  Virtual visits or after you leave the clinic:  Your provider has placed  a follow-up order in the [employer] portal for scheduling your return appointment. A member of the scheduling team will contact you to schedule.  [email or web address] Scheduling: Timely scheduling through [employer] is advised to ensure appointment availability.   Call to schedule: You may schedule your follow-up appointment(s) by calling [phone], option 1.    How do I schedule my endoscopy or colonoscopy procedure:  If a procedure, such as a colonoscopy or upper endoscopy was ordered by your provider, the scheduling team will contact you to schedule this procedure. Or you may choose to call to schedule at   [phone], option 2.  Please allow 20-30 minutes when scheduling a procedure.    How do I get my blood work done? To get your blood work done, you need to schedule a lab appointment at an [facility]. There are multiple ways to schedule:   At the clinic: The Clinic Coordinator you meet after your visit can help you schedule a lab appointment.   [employer] scheduling: [employer] offers online lab scheduling at all [facility] laboratory locations.   Call to schedule: You can call [phone] to schedule your lab appointment.    How do I schedule my imaging study: To schedule imaging studies, such as CT scans, ultrasounds, MRIs, or X-rays, contact Imaging Services at [phone].    How do I schedule a referral to another doctor: If your provider recommended a referral to another specialist(s), the referral order was placed by your provider. You will receive a phone call to schedule this referral, or you may choose to call the number attached to the referral to self-schedule.    For Post-Visit Question(s):  For any inquiries following today's visit:  Please utilize [employer] messaging and allow 48 hours for reply or contact the Call Center during normal business hours at [phone], option 3.  For Emergent After-hours questions, contact the On-Call GI Fellow through the [facility] at  [phone].  In addition, you may contact your Nurse directly using the provided contact information.    Test Results:  Test results will be accessible via [employer] in compliance with the 21st Century Cures Act. This means that your results will be available to you at the same time as your provider. Often you may see your results before your provider does. Results are reviewed by staff within two weeks with communication follow-up. Results may be released in the patient portal prior to your care team review.    Prescription Refill(s):  Medication prescribed by your provider will be addressed during your visit. For future refills, please coordinate with your pharmacy. If you have not had a recent clinic visit or routine labs, for your safety, your provider may not be able to refill your prescription.     Sincerely,    Michelle Silverman PA-C  Gastroenterology  --  Lifestyle modifications for gastroesophageal reflux disease (GERD).     1. Change your eating habits.  -- It's best to eat several small meals instead of two or three large meals.  -- After you eat, wait 2 to 3 hours before you lie down. Late-night snacks aren't a good idea.  -- Chocolate, mint, and alcohol can make GERD worse. They relax the valve between the esophagus and the stomach.  -- Spicy foods, fatty foods, foods that have a lot of acid (like tomatoes and oranges), and coffee can make GERD symptoms worse in some people. If your symptoms are worse after you eat a certain food, you may want to stop eating that food to see if your symptoms get better.    2. Do not smoke or chew tobacco.    3. If you have GERD symptoms at night, raise the head of your bed 6 in. (15 cm) to 8 in. (20 cm) by putting the frame on blocks or placing a foam wedge under the head of your mattress. (Adding extra pillows does not work.)    4. Avoid or reduce pressure on your stomach. Don't wear tight clothing around your middle.    5. Lose weight if you need to. Losing just 5 to 10  pounds can help.

## 2025-04-24 NOTE — LETTER
4/24/2025      Gerson De Jesus  [address]  [facility] [address]      Dear Colleague,    Thank you for referring your patient, Gerson De Jesus, to the [facility] [place]. Please see a copy of my visit note below.      GI CLINIC VISIT    CC/REFERRING MD:  Shelly Norton  REASON FOR CONSULTATION: GERD    ASSESSMENT/PLAN:  Gerson De Jesus is a 34 year old year old male with PMHx significant for heartburn who presents seeing the  [facility] GI team for GERD and loose stools. Father has brown esophagus but there is no fhx of esohageal cancer. Never smoker. Some ETOH use.     #GERD  #LA Grade C esophagitis   #dysphagia   4/2024 EGD (done on pepcid 20 mg BID) diagnostic of erosive GERD with evidence of 5 cm hiatal hernia. Stomach and duodenum both were normal.     -Discussed findings, treatment (and potential long-term PPI tx), GERD, hiatal hernia, potential transformation to BE and then potentially esophageal cancer   -Start omeprazole 40 mg daily, administration reviewed and repeat EGD on PPI in 3 mo   -OK to continue pepcid 20 mg BID, can taper off too depending on clinical sx  -OK to add on alginate given HH    -advised to avoid NSAIDs  -GERD diet/lifestyle recommendations per AVS     Plan  -start omeprazole 40 mg daily + pepcid 20 mg BID +/- alginate   -Repeat EGD on PPI in 3 mo     Future consideration  If w/u negative  1.BID PPI +/- BID H2RA. Tx also includes alternate PPI vs P-CAB  2. Consider surgical consult for medically relcitant GERD   3. VFSS to be considered   4. NMGES     #Hiatal Hernia  #GEJ disruption  Education regarding hiatal hernias and how the anatomic disruption to gastroesophageal junction (GEJ) predisposes him to acid reflux events. Does have regurgitation.     Plan  - Possible addition of alginate for regurgitation (reflux gourmet or gaviscon extra strength)    Future consideration  1. consider thoracic consult for unmanaged GERD +/- large  symptomatic hernia     #Loose stools   Chronic loose stools that improved after initiation of low gluten/GFD in past 5d. Unclear if this is a coincidence or reflective of malabsorption / celiac disease or possible gluten sensitivity . No fhx of celiac dz and duodenum looked normal on recent scope.     Plan  -gluten challenge then celiac serologies  --if Ab high, plan to ask for duodenal biopsies on gluten containing diet     Future consideration  1.If he were to develop loose stools again, will plan further work up as clinically indicated     Orders Placed This Encounter   Procedures     Helicobacter pylori Antigen Stool     Tissue transglutaminase shikha IgA and IgG     IgA     Adult GI  Referral - Procedure Only     Colorectal cancer screenin    RTC 2 wk after EGD    Thank you for this consultation.  It was a pleasure to participate in the care of this patient; please contact me with any further questions.     Michelle Silverman PA-C    Follow up: As planned above. Today, I personally spent 50  minutes spent on the date of the encounter doing chart review, history and exam, documentation and further activities per the note.    HPI  Gerson De Jesus is a 34 year old year old male with no sig PMHx following with the [facility] GI group for GERD.     1. Reflux x 18 years   -persistent sx in AM, aggravated by eating rich, sweet foods and alcohol; trying to limit  -also w/ acid brash, nocturnal coughing, heartburn, regurgitation (more in AM), some swallowing troubles but no odynphagia, post-prandial bloating   -been on famotidine BID for a year now which has helped a bit  -omeprazole was just sent but not started yet - wanted to know about taking the medicine or if it will replace H2RA   -No abd pain   Does have nausea but rarely will vomit  Having sx of regur (more in AM)     NSAIDs - once monthly if that, but not very regular at all     Father with brown esophagus but no known fhx of esophageal cancer   Reflux issues  run in family (male)    2.   No trouble w/ sedation   Post prandial bloating  No abd pain in general     Bowel Habits - fairly loose  Better after avoiding/limiting gluten in past 5d  Prior to diet change in past week  - average will have 4-5 stools a day, oatmeal --> seemed to have worsened in last 8 years but has had long-standing troubles w/ loose stools   -clustered movements, incomplete evacuation, on toilet for 1-2 min, no straining  -usually need to stool right after eating with a lot of urgency  -no bloody or black stools   -nocturnal stooling is not common for him   -No days /wo stooling     Now on a low gluten and or GFD he is having 1 stool a day, sausage log like stool, better evacuation these days     No IBD, celiac disease, CRC in his family     Weight - stable   Appetite stable     Asthma in his family. Allergy to cats.     Family Hx  Father w/ brown esophagus   No other known family history or GI related malignancy (esophageal, gastric, pancreatic, liver or colon) or family history of IBD/celiac disease.     Social Hx     occasional use of NSAIDs or Tylenol    Yes ETOH - 2 drinks a week, beer, 8 oz servings  Never tobacco products   No recreational drug use     PERTINENT PAST MEDICAL HISTORY:  No past medical history on file.    PREVIOUS SURGERIES:  Past Surgical History:   Procedure Laterality Date     DENTAL SURGERY       ESOPHAGOSCOPY, GASTROSCOPY, DUODENOSCOPY (EGD), COMBINED N/A 4/21/2025    Procedure: Esophagoscopy, gastroscopy, duodenoscopy (EGD), combined;  Surgeon: Sarah Moser MD;  Location: [facility] OR       ALLERGIES:   No Known Allergies    PERTINENT MEDICATIONS:    Current Outpatient Medications:      calcium carbonate (TUMS) 500 MG chewable tablet, Take 500 mg by mouth daily, Disp: , Rfl:      famotidine (PEPCID) 20 MG tablet, TAKE 1 TABLET BY MOUTH TWICE A DAY, Disp: 180 tablet, Rfl: 0     omeprazole (PRILOSEC) 40 MG DR capsule, Take 1 capsule (40 mg) by mouth daily., Disp: 90  capsule, Rfl: 3    SOCIAL HISTORY:  Social History     Socioeconomic History     Marital status:      Spouse name: Not on file     Number of children: Not on file     Years of education: Not on file     Highest education level: Not on file   Occupational History     Not on file   Tobacco Use     Smoking status: Never     Smokeless tobacco: Never   Vaping Use     Vaping status: Never Used   Substance and Sexual Activity     Alcohol use: Yes     Drug use: Never     Sexual activity: Yes   Other Topics Concern     Not on file   Social History Narrative     Not on file     Social Drivers of Health     Financial Resource Strain: Low Risk  (2/28/2024)    Financial Resource Strain      Within the past 12 months, have you or your family members you live with been unable to get utilities (heat, electricity) when it was really needed?: No   Food Insecurity: Low Risk  (2/28/2024)    Food Insecurity      Within the past 12 months, did you worry that your food would run out before you got money to buy more?: No      Within the past 12 months, did the food you bought just not last and you didn t have money to get more?: No   Transportation Needs: Low Risk  (2/28/2024)    Transportation Needs      Within the past 12 months, has lack of transportation kept you from medical appointments, getting your medicines, non-medical meetings or appointments, work, or from getting things that you need?: No   Physical Activity: Unknown (2/28/2024)    Exercise Vital Sign      Days of Exercise per Week: 7 days      Minutes of Exercise per Session: Not on file   Stress: Stress Concern Present (2/28/2024)    [employer] - Occupational Stress Questionnaire      Feeling of Stress : To some extent   Social Connections: Unknown (2/28/2024)    Social Connection and Isolation Panel [NHANES]      Frequency of Communication with Friends and Family: Not on file      Frequency of Social Gatherings with Friends and Family:  "Once a week      Attends Evangelical Services: Not on file      Active Member of Clubs or Organizations: Not on file      Attends Club or Organization Meetings: Not on file      Marital Status: Not on file   Interpersonal Safety: Low Risk  (4/21/2025)    Interpersonal Safety      Do you feel physically and emotionally safe where you currently live?: Yes      Within the past 12 months, have you been hit, slapped, kicked or otherwise physically hurt by someone?: No      Within the past 12 months, have you been humiliated or emotionally abused in other ways by your partner or ex-partner?: No   Housing Stability: Low Risk  (2/28/2024)    Housing Stability      Do you have housing? : Yes      Are you worried about losing your housing?: No       FAMILY HISTORY:  Family History   Problem Relation Age of Onset     Breast Cancer Mother      Melanoma Mother      Melanoma Father      GERD Father      No Known Problems Sister      GERD Brother      No Known Problems Brother      No Known Problems Brother      Cancer Maternal Grandmother         chest tumor     Alzheimer Disease Maternal Grandfather      Alzheimer Disease Paternal Grandmother        Past/family/social history reviewed and no changes    PHYSICAL EXAMINATION:  Vitals reviewed: /79   Pulse 75   Ht 1.829 m (6')   Wt 94.8 kg (209 lb 1.6 oz)   SpO2 98%   BMI 28.36 kg/m    Constitutional: aaox3, cooperative, pleasant, not dyspneic/diaphoretic, no acute distress  Eyes: Sclera anicteric/injected  Ears/nose/mouth/throat: hearing intact  Neck: supple, active ROM w/o limitation or pain   CV: No edema  Respiratory: Unlabored breathing  Abd:  Nondistended, mild tenderness to epigastric region on deep palpation \"burning\", no peritoneal signs, neg Thomason's sign  Skin: warm, perfused, no jaundice  Psych: Normal affect  MSK: Normal gait     PERTINENT STUDIES:    Office Visit on 03/14/2025   Component Date Value Ref Range Status     TSH 03/14/2025 2.46  0.30 - 4.20 uIU/mL " Final     Sodium 03/14/2025 142  135 - 145 mmol/L Final     Potassium 03/14/2025 4.4  3.4 - 5.3 mmol/L Final     Carbon Dioxide (CO2) 03/14/2025 23  22 - 29 mmol/L Final     Anion Gap 03/14/2025 14  7 - 15 mmol/L Final     Urea Nitrogen 03/14/2025 13.0  6.0 - 20.0 mg/dL Final     Creatinine 03/14/2025 0.99  0.67 - 1.17 mg/dL Final     GFR Estimate 03/14/2025 >90  >60 mL/min/1.73m2 Final     Calcium 03/14/2025    Final     Chloride 03/14/2025 105  98 - 107 mmol/L Final     Glucose 03/14/2025 103 (H)  70 - 99 mg/dL Final     Alkaline Phosphatase 03/14/2025 60  40 - 150 U/L Final     AST 03/14/2025 23  0 - 45 U/L Final     ALT 03/14/2025 46  0 - 70 U/L Final     Protein Total 03/14/2025 6.8  6.4 - 8.3 g/dL Final     Albumin 03/14/2025 4.5  3.5 - 5.2 g/dL Final     Bilirubin Total 03/14/2025 0.4  <=1.2 mg/dL Final     WBC Count 03/14/2025 5.3  4.0 - 11.0 10e3/uL Final     RBC Count 03/14/2025 5.45  4.40 - 5.90 10e6/uL Final     Hemoglobin 03/14/2025 16.3  13.3 - 17.7 g/dL Final     Hematocrit 03/14/2025 45.5  40.0 - 53.0 % Final     MCV 03/14/2025 84  78 - 100 fL Final     MCH 03/14/2025 29.9  26.5 - 33.0 pg Final     MCHC 03/14/2025 35.8  31.5 - 36.5 g/dL Final     RDW 03/14/2025 11.4  10.0 - 15.0 % Final     Platelet Count 03/14/2025 257  150 - 450 10e3/uL Final     % Neutrophils 03/14/2025 45  % Final     % Lymphocytes 03/14/2025 44  % Final     % Monocytes 03/14/2025 8  % Final     % Eosinophils 03/14/2025 2  % Final     % Basophils 03/14/2025 1  % Final     % Immature Granulocytes 03/14/2025 0  % Final     Absolute Neutrophils 03/14/2025 2.4  1.6 - 8.3 10e3/uL Final     Absolute Lymphocytes 03/14/2025 2.3  0.8 - 5.3 10e3/uL Final     Absolute Monocytes 03/14/2025 0.5  0.0 - 1.3 10e3/uL Final     Absolute Eosinophils 03/14/2025 0.1  0.0 - 0.7 10e3/uL Final     Absolute Basophils 03/14/2025 0.1  0.0 - 0.2 10e3/uL Final     Absolute Immature Granulocytes 03/14/2025 0.0  <=0.4 10e3/uL Final     Specimen Status  03/14/2025 Specimen received. Reordered and sent to performing laboratory. Report to follow upon completion.   Final     Performing Laboratory 03/14/2025 [facility]   Final     Test Name 03/14/2025 Calcium, Serum or Plasma   Final     Test Code 03/14/2025 [id number]   Final     Miscellaneous Test 03/14/2025 SEE NOTE   Final       Last Comprehensive Metabolic Panel:  Lab Results   Component Value Date     03/14/2025    POTASSIUM 4.4 03/14/2025    CHLORIDE 105 03/14/2025    CO2 23 03/14/2025    ANIONGAP 14 03/14/2025     (H) 03/14/2025    BUN 13.0 03/14/2025    CR 0.99 03/14/2025    GFRESTIMATED >90 03/14/2025    SUMMER  03/14/2025      Comment:      Assay at [facility] unavailable, Calcium reordered to Reference Lab.          TSH   Date Value Ref Range Status   03/14/2025 2.46 0.30 - 4.20 uIU/mL Final        PREVIOUS ENDOSCOPY    No results found for this or any previous visit.  Upper GI Endoscopy 04/21/2025 12:13 PM [facility]  [address] [place] [address]., [place] [address] [phone]     Endoscopy Department  _______________________________________________________________________________  Patient Name: Gerson Munoz         Procedure Date: 4/21/2025 12:13 PM  MRN: [id number]                       Account Number: [id number]  YOB: 1990              Admit Type: Outpatient  Age: 34                               Room: [facility] PROCEDURE ROOM 05  Gender: Male                          Note Status: Finalized  Attending MD: SARIKA HOFFMAN MD,    Pause for the Cause: performed.  Total Sedation Time: 11 minutes of continuous 1:1 bedside monitoring performed.  _______________________________________________________________________________     Procedure:             Upper GI endoscopy  Indications:           Heartburn  Providers:             SARIKA HOFFMAN MD, Maritza Brice RN, Shayna Gann RN, Hoda Mills, Technologist  Referring MD:          ESSENCE  ROSY SULLIVAN MD  Medicines:             Midazolam 5 mg IV, Fentanyl 100 micrograms IV,                         Benzocaine spray  Complications:         No immediate complications.  _______________________________________________________________________________  Procedure:             Pre-Anesthesia Assessment:                         - Prior to the procedure, a History and Physical was                         performed, and patient medications and allergies were                         reviewed. The patient is competent. The risks and                         benefits of the procedure and the sedation options and                         risks were discussed with the patient. All questions                         were answered and informed consent was obtained.                         Patient identification and proposed procedure were                         verified by the physician, the nurse and the                         technician in the pre-procedure area in the procedure                         room. Mental Status Examination: alert and oriented.                         Airway Examination: normal oropharyngeal airway and                         neck mobility. Respiratory Examination: clear to                         auscultation. CV Examination: normal. Prophylactic                         Antibiotics: The patient does not require prophylactic                         antibiotics. Prior Anticoagulants: The patient has                         taken no anticoagulant or antiplatelet agents. ASA                         Grade Assessment: I - A normal, healthy patient. After                         reviewing the risks and benefits, the patient was                         deemed in satisfactory condition to undergo the                         procedure. The anesthesia plan was to use moderate                         sedation / analgesia (conscious sedation). Immediately                         prior to  administration of medications, the patient                         was re-assessed for adequacy to receive sedatives. The                         heart rate, respiratory rate, oxygen saturations,                         blood pressure, adequacy of pulmonary ventilation, and                         response to care were monitored throughout the                         procedure. The physical status of the patient was                         re-assessed after the procedure.                         After obtaining informed consent, the endoscope was                         passed under direct vision. Throughout the procedure,                         the patient's blood pressure, pulse, and oxygen                         saturations were monitored continuously. The Endoscope                         was introduced through the mouth, and advanced to the                         second part of duodenum. The upper GI endoscopy was                         somewhat difficult due to the patient's discomfort                         during the procedure. The patient tolerated the                         procedure fairly well.                                                                                   Findings:       The gastroesophageal flap valve was visualized endoscopically and       classified as Hill Grade IV (no fold, wide open lumen, hiatal hernia       present).       A 5 cm hiatal hernia was present.       LA Grade C (one or more mucosal breaks continuous between tops of 2 or       more mucosal folds, less than 75% circumference) esophagitis with no       bleeding was found 37 cm from the incisors.       The entire examined stomach was normal.       The duodenal bulb, first portion of the duodenum and second portion of       the duodenum were normal.                                                                                   Impression:            - Gastroesophageal flap valve classified as Hill Grade                          IV (no fold, wide open lumen, hiatal hernia present).                         - 5 cm hiatal hernia.                         - LA Grade C reflux esophagitis with no bleeding.                         - Normal stomach.                         - Normal duodenal bulb, first portion of the duodenum                         and second portion of the duodenum.                         - No specimens collected.  Recommendation:        - Discharge patient to home (ambulatory).                         - Resume previous diet.                         - Continue present medications.                         - The findings and recommendations were discussed with                         the patient.                         - Use Prilosec (omeprazole) 40 mg PO daily.                         - Repeat upper endoscopy in 3 months to check healing                         under MAC.                                                                                     Electronically signed by: Sarah Moser MD  __________________         Again, thank you for allowing me to participate in the care of your patient.        Sincerely,        Michelle Silverman PA-C    Electronically signed

## 2025-04-28 ENCOUNTER — TELEPHONE (OUTPATIENT)
Dept: GASTROENTEROLOGY | Facility: CLINIC | Age: 35
End: 2025-04-28
Payer: COMMERCIAL

## 2025-04-28 LAB
TTG IGA SER-ACNC: 0.5 U/ML
TTG IGG SER-ACNC: <0.6 U/ML

## 2025-04-28 NOTE — TELEPHONE ENCOUNTER
"Endoscopy Scheduling Screen    Caller: patient    Have you had any respiratory illness or flu-like symptoms in the last 10 days?  No    What is your communication preference for Instructions and/or Bowel Prep?   MyChart    What insurance is in the chart?  Other:      Ordering/Referring Provider:   SARIKA HOFFMAN    (If ordering provider performs procedure, schedule with ordering provider unless otherwise instructed. )    BMI: Estimated body mass index is 28.36 kg/m  as calculated from the following:    Height as of 4/24/25: 1.829 m (6').    Weight as of 4/24/25: 94.8 kg (209 lb 1.6 oz).     Sedation Ordered  MAC/deep sedation.   BMI<= 45 45 < BMI <= 48 48 < BMI < = 50  BMI > 50   No Restrictions No [facility]  No [facility]  [facility] with exceptions Hospital Only OR Only       Do you have a history of malignant hyperthermia?  No    (Females) Are you currently pregnant?   No     Have you been diagnosed or told you have pulmonary hypertension?   No    Do you have an LVAD?  No    Have you been told you have moderate to severe sleep apnea?  No.    Have you been told you have COPD, asthma, or any other lung disease?  No    Has your doctor ordered any cardiac tests like echo, angiogram, stress test, ablation, or EKG, that you have not completed yet?  No    Do you  have a history of any heart conditions?  No     Have you ever had or are you waiting for an organ transplant?  No. Continue scheduling, no site restrictions.    Have you had a stroke or transient ischemic attack (TIA aka \"mini stroke\") in the last 2 years?   No.    Have you been diagnosed with or been told you have cirrhosis of the liver?   No.    Are you currently on dialysis?   No    Do you need assistance transferring?   No    BMI: Estimated body mass index is 28.36 kg/m  as calculated from the following:    Height as of 4/24/25: 1.829 m (6').    Weight as of 4/24/25: 94.8 kg (209 lb 1.6 oz).     Is patients BMI > 40 and scheduling location [facility]?  No    Do you " take an injectable or oral medication for weight loss or diabetes (excluding insulin)?  No    Do you take the medication Naltrexone?  No    Do you take blood thinners?  No       Prep   Are you currently on dialysis or do you have chronic kidney disease?  No    Do you have a diagnosis of diabetes?  No    Do you have a diagnosis of cystic fibrosis (CF)?  No    On a regular basis do you go 3 -5 days between bowel movements?  No    BMI > 40?  No    Preferred Pharmacy:    [facility] [id number] [place] [address]  [address] [facility] [address]  Phone: [phone] Fax: [phone]          Final Scheduling Details     Procedure scheduled  Upper endoscopy (EGD)    Surgeon:  SARIKA HOFFMAN       Date of procedure:  7/15     Pre-OP / PAC:   No - Not required for this site.    Location  [facility] - [facility] - Patient preference.    Sedation   MAC/Deep Sedation - Per order.      Patient Reminders:   You will receive a call from a Nurse to review instructions and health history.  This assessment must be completed prior to your procedure.  Failure to complete the Nurse assessment may result in the procedure being cancelled.      On the day of your procedure, please designate an adult(s) who can drive you home stay with you for the next 24 hours. The medicines used in the exam will make you sleepy. You will not be able to drive.      You cannot take public transportation, ride share services, or non-medical taxi service without a responsible caregiver.  Medical transport services are allowed with the requirement that a responsible caregiver will receive you at your destination.  We require that drivers and caregivers are confirmed prior to your procedure.

## 2025-04-30 PROCEDURE — 87338 HPYLORI STOOL AG IA: CPT | Performed by: PHYSICIAN ASSISTANT

## 2025-04-30 PROCEDURE — 99000 SPECIMEN HANDLING OFFICE-LAB: CPT | Performed by: PATHOLOGY

## 2025-05-01 LAB — H PYLORI AG STL QL IA: NEGATIVE

## 2025-06-25 ENCOUNTER — TELEPHONE (OUTPATIENT)
Dept: GASTROENTEROLOGY | Facility: CLINIC | Age: 35
End: 2025-06-25
Payer: COMMERCIAL

## 2025-06-25 NOTE — TELEPHONE ENCOUNTER
Pre assessment completed for upcoming procedure.   (Please see previous telephone encounter notes for complete details)    Procedure details:    Procedure date 7/15/25, arrival time 1045 and facility location reviewed.    Pre op exam needed? No.    Designated  policy reviewed. Instructed to have someone stay 24  hours post procedure.     Medication review:    Medications reviewed. Please see supporting documentation below. Holding recommendations discussed (if applicable).     Prep for procedure:     Procedure prep instructions reviewed.      Any additional information needed:  N/A    Patient verbalized understanding and had no questions or concerns at this time.      Nelia Espana LPN  Endoscopy Procedure Pre Assessment   [phone] option 3

## 2025-06-25 NOTE — TELEPHONE ENCOUNTER
Pre visit planning completed.      Procedure details:    Patient scheduled for Upper endoscopy (EGD) on 7/15/25.     Arrival time: 1045. Procedure time 1145    Facility location: [facility]; [address] [place] [address], [address] Floor, [place] [address]. Check in location: 5th Floor.    Sedation type: MAC    Pre op exam needed? No.    Indication for procedure:   Loose stools [R19.5]  - Primary      Gastroesophageal reflux disease without esophagitis [K21.9]      Levy grade C esophagitis [K20.80]            Chart review:     Electronic implanted devices? No    Recent diagnosis of diverticulitis within the last 6 weeks? No      Medication review:    Diabetic? No    Anticoagulants? No    Weight loss medication/injectable? No GLP-1 medication per patient's medication list. Nursing to verify with pre-assessment call.    Other medication HOLDING recommendations:  N/A      Prep for procedure:       Procedure information and instructions sent via [employer]         July Burt RN  Endoscopy Procedure Pre Assessment   [phone] option 3

## 2025-07-15 ENCOUNTER — ANESTHESIA EVENT (OUTPATIENT)
Dept: SURGERY | Facility: AMBULATORY SURGERY CENTER | Age: 35
End: 2025-07-15
Payer: COMMERCIAL

## 2025-07-15 ENCOUNTER — ANESTHESIA (OUTPATIENT)
Dept: SURGERY | Facility: AMBULATORY SURGERY CENTER | Age: 35
End: 2025-07-15
Payer: COMMERCIAL

## 2025-07-15 ENCOUNTER — HOSPITAL ENCOUNTER (OUTPATIENT)
Facility: AMBULATORY SURGERY CENTER | Age: 35
Discharge: HOME | End: 2025-07-15
Attending: INTERNAL MEDICINE
Payer: COMMERCIAL

## 2025-07-15 VITALS
SYSTOLIC BLOOD PRESSURE: 121 MMHG | TEMPERATURE: 97.6 F | WEIGHT: 205 LBS | BODY MASS INDEX: 27.77 KG/M2 | DIASTOLIC BLOOD PRESSURE: 69 MMHG | RESPIRATION RATE: 15 BRPM | OXYGEN SATURATION: 96 % | HEIGHT: 72 IN | HEART RATE: 78 BPM

## 2025-07-15 VITALS — HEART RATE: 100 BPM

## 2025-07-15 LAB — UPPER GI ENDOSCOPY: NORMAL

## 2025-07-15 PROCEDURE — 88342 IMHCHEM/IMCYTCHM 1ST ANTB: CPT | Mod: TC | Performed by: INTERNAL MEDICINE

## 2025-07-15 PROCEDURE — 43239 EGD BIOPSY SINGLE/MULTIPLE: CPT | Performed by: INTERNAL MEDICINE

## 2025-07-15 PROCEDURE — 88305 TISSUE EXAM BY PATHOLOGIST: CPT | Mod: 26 | Performed by: STUDENT IN AN ORGANIZED HEALTH CARE EDUCATION/TRAINING PROGRAM

## 2025-07-15 PROCEDURE — 88342 IMHCHEM/IMCYTCHM 1ST ANTB: CPT | Mod: 26 | Performed by: STUDENT IN AN ORGANIZED HEALTH CARE EDUCATION/TRAINING PROGRAM

## 2025-07-15 RX ORDER — NALOXONE HYDROCHLORIDE 0.4 MG/ML
0.2 INJECTION, SOLUTION INTRAMUSCULAR; INTRAVENOUS; SUBCUTANEOUS
Status: DISCONTINUED | OUTPATIENT
Start: 2025-07-15 | End: 2025-07-16 | Stop reason: HOSPADM

## 2025-07-15 RX ORDER — ONDANSETRON 4 MG/1
4 TABLET, ORALLY DISINTEGRATING ORAL EVERY 6 HOURS PRN
Status: DISCONTINUED | OUTPATIENT
Start: 2025-07-15 | End: 2025-07-16 | Stop reason: HOSPADM

## 2025-07-15 RX ORDER — KETAMINE HYDROCHLORIDE 10 MG/ML
INJECTION INTRAMUSCULAR; INTRAVENOUS PRN
Status: DISCONTINUED | OUTPATIENT
Start: 2025-07-15 | End: 2025-07-15

## 2025-07-15 RX ORDER — ONDANSETRON 2 MG/ML
4 INJECTION INTRAMUSCULAR; INTRAVENOUS
Status: DISCONTINUED | OUTPATIENT
Start: 2025-07-15 | End: 2025-07-16 | Stop reason: HOSPADM

## 2025-07-15 RX ORDER — LIDOCAINE HYDROCHLORIDE 20 MG/ML
INJECTION, SOLUTION INFILTRATION; PERINEURAL PRN
Status: DISCONTINUED | OUTPATIENT
Start: 2025-07-15 | End: 2025-07-15

## 2025-07-15 RX ORDER — NALOXONE HYDROCHLORIDE 0.4 MG/ML
0.4 INJECTION, SOLUTION INTRAMUSCULAR; INTRAVENOUS; SUBCUTANEOUS
Status: DISCONTINUED | OUTPATIENT
Start: 2025-07-15 | End: 2025-07-16 | Stop reason: HOSPADM

## 2025-07-15 RX ORDER — PROPOFOL 10 MG/ML
INJECTION, EMULSION INTRAVENOUS PRN
Status: DISCONTINUED | OUTPATIENT
Start: 2025-07-15 | End: 2025-07-15

## 2025-07-15 RX ORDER — GLYCOPYRROLATE 0.2 MG/ML
INJECTION, SOLUTION INTRAMUSCULAR; INTRAVENOUS PRN
Status: DISCONTINUED | OUTPATIENT
Start: 2025-07-15 | End: 2025-07-15

## 2025-07-15 RX ORDER — ONDANSETRON 2 MG/ML
4 INJECTION INTRAMUSCULAR; INTRAVENOUS EVERY 6 HOURS PRN
Status: DISCONTINUED | OUTPATIENT
Start: 2025-07-15 | End: 2025-07-16 | Stop reason: HOSPADM

## 2025-07-15 RX ORDER — LIDOCAINE 40 MG/G
CREAM TOPICAL
Status: DISCONTINUED | OUTPATIENT
Start: 2025-07-15 | End: 2025-07-16 | Stop reason: HOSPADM

## 2025-07-15 RX ORDER — PROCHLORPERAZINE MALEATE 10 MG
10 TABLET ORAL EVERY 6 HOURS PRN
Status: DISCONTINUED | OUTPATIENT
Start: 2025-07-15 | End: 2025-07-16 | Stop reason: HOSPADM

## 2025-07-15 RX ORDER — FLUMAZENIL 0.1 MG/ML
0.2 INJECTION, SOLUTION INTRAVENOUS
Status: DISCONTINUED | OUTPATIENT
Start: 2025-07-15 | End: 2025-07-16 | Stop reason: HOSPADM

## 2025-07-15 RX ORDER — SODIUM CHLORIDE, SODIUM LACTATE, POTASSIUM CHLORIDE, CALCIUM CHLORIDE 600; 310; 30; 20 MG/100ML; MG/100ML; MG/100ML; MG/100ML
INJECTION, SOLUTION INTRAVENOUS CONTINUOUS PRN
Status: DISCONTINUED | OUTPATIENT
Start: 2025-07-15 | End: 2025-07-15

## 2025-07-15 RX ORDER — PROPOFOL 10 MG/ML
INJECTION, EMULSION INTRAVENOUS CONTINUOUS PRN
Status: DISCONTINUED | OUTPATIENT
Start: 2025-07-15 | End: 2025-07-15

## 2025-07-15 RX ADMIN — PROPOFOL 200 MCG/KG/MIN: 10 INJECTION, EMULSION INTRAVENOUS at 12:18

## 2025-07-15 RX ADMIN — PROPOFOL 100 MG: 10 INJECTION, EMULSION INTRAVENOUS at 12:18

## 2025-07-15 RX ADMIN — PROPOFOL 15 MG: 10 INJECTION, EMULSION INTRAVENOUS at 12:24

## 2025-07-15 RX ADMIN — SODIUM CHLORIDE, SODIUM LACTATE, POTASSIUM CHLORIDE, CALCIUM CHLORIDE: 600; 310; 30; 20 INJECTION, SOLUTION INTRAVENOUS at 12:14

## 2025-07-15 RX ADMIN — GLYCOPYRROLATE 0.2 MG: 0.2 INJECTION, SOLUTION INTRAMUSCULAR; INTRAVENOUS at 12:16

## 2025-07-15 RX ADMIN — SODIUM CHLORIDE, SODIUM LACTATE, POTASSIUM CHLORIDE, CALCIUM CHLORIDE: 600; 310; 30; 20 INJECTION, SOLUTION INTRAVENOUS at 12:04

## 2025-07-15 RX ADMIN — LIDOCAINE HYDROCHLORIDE 60 MG: 20 INJECTION, SOLUTION INFILTRATION; PERINEURAL at 12:18

## 2025-07-15 RX ADMIN — KETAMINE HYDROCHLORIDE 20 MG: 10 INJECTION INTRAMUSCULAR; INTRAVENOUS at 12:17

## 2025-07-15 RX ADMIN — PROPOFOL 25 MG: 10 INJECTION, EMULSION INTRAVENOUS at 12:21

## 2025-07-15 RX ADMIN — PROPOFOL 15 MG: 10 INJECTION, EMULSION INTRAVENOUS at 12:27

## 2025-07-15 RX ADMIN — GLYCOPYRROLATE 300 MG: 0.2 INJECTION, SOLUTION INTRAMUSCULAR; INTRAVENOUS at 12:31

## 2025-07-15 NOTE — ANESTHESIA PREPROCEDURE EVALUATION
Anesthesia Pre-Procedure Evaluation    Patient: Gerson De Jesus   MRN: [id number] : 1990          Procedure : Procedure(s):  Esophagoscopy, gastroscopy, duodenoscopy (EGD), combined         No past medical history on file.   Past Surgical History:   Procedure Laterality Date     DENTAL SURGERY       ESOPHAGOSCOPY, GASTROSCOPY, DUODENOSCOPY (EGD), COMBINED N/A 2025    Procedure: Esophagoscopy, gastroscopy, duodenoscopy (EGD), combined;  Surgeon: Sarah Moser MD;  Location: [facility] OR      Allergies   Allergen Reactions     Cat Dander Anxiety, Headache, Itching, Palpitations and Shortness Of Breath      Social History     Tobacco Use     Smoking status: Never     Smokeless tobacco: Never   Substance Use Topics     Alcohol use: Yes      Wt Readings from Last 1 Encounters:   07/15/25 93 kg (205 lb)        Anesthesia Evaluation   Pt has had prior anesthetic.     No history of anesthetic complications       ROS/MED HX  ENT/Pulmonary:       Neurologic:       Cardiovascular:       METS/Exercise Tolerance:     Hematologic:       Musculoskeletal:       GI/Hepatic:     (+) GERD,                   Renal/Genitourinary:       Endo:       Psychiatric/Substance Use:       Infectious Disease:       Malignancy:       Other:              Physical Exam  Airway  Mallampati: I  TM distance: >3 FB  Neck ROM: full  Mouth opening: >= 4 cm    Cardiovascular - normal exam   Dental   (+) Minor Abnormalities - some fillings, tiny chips      Pulmonary - normal exam      Neurological - normal exam  He appears awake, alert and oriented x3.    Other Findings       OUTSIDE LABS:  CBC:   Lab Results   Component Value Date    WBC 5.3 2025    WBC 5.0 2024    HGB 16.3 2025    HGB 16.4 2024    HCT 45.5 2025    HCT 46.3 2024     2025     2024     BMP:   Lab Results   Component Value Date     2025     2024    POTASSIUM 4.4 2025    POTASSIUM 4.3  "02/28/2024    CHLORIDE 105 03/14/2025    CHLORIDE 104 02/28/2024    CO2 23 03/14/2025    CO2 28 02/28/2024    BUN 13.0 03/14/2025    BUN 13.8 02/28/2024    CR 0.99 03/14/2025    CR 0.96 02/28/2024     (H) 03/14/2025     (H) 02/28/2024     COAGS: No results found for: \"PTT\", \"INR\", \"FIBR\"  POC: No results found for: \"BGM\", \"HCG\", \"HCGS\"  HEPATIC:   Lab Results   Component Value Date    ALBUMIN 4.5 03/14/2025    PROTTOTAL 6.8 03/14/2025    ALT 46 03/14/2025    AST 23 03/14/2025    ALKPHOS 60 03/14/2025    BILITOTAL 0.4 03/14/2025     OTHER:   Lab Results   Component Value Date    SUMMER  03/14/2025      Comment:      Assay at [facility] unavailable, Calcium reordered to [facility].       TSH 2.46 03/14/2025       Anesthesia Plan    ASA Status:  1      NPO Status: NPO Appropriate   Anesthesia Type: MAC.  Airway: natural airway.  Induction: intravenous.  Maintenance: TIVA.   Techniques and Equipment:       - Monitoring Plan: standard ASA monitoring     Consents    Anesthesia Plan(s) and associated risks, benefits, and realistic alternatives discussed. Questions answered and patient/representative(s) expressed understanding.     - Discussed: anesthesiologist     - Discussed with:  Patient               Postoperative Care    Pain management: multimodal analgesia.     Comments:                 Edi Elise MD    I have reviewed the pertinent notes and labs in the chart from the past 30 days and (re)examined the patient.  Any updates or changes from those notes are reflected in this note.    Clinically Significant Risk Factors Present on Admission                             # Overweight: Estimated body mass index is 27.8 kg/m  as calculated from the following:    Height as of this encounter: 1.829 m (6').    Weight as of this encounter: 93 kg (205 lb).                    "

## 2025-07-15 NOTE — ANESTHESIA POSTPROCEDURE EVALUATION
Patient: Gerson De Jesus    Procedure: Procedure(s):  ESOPHAGOGASTRODUODENOSCOPY, WITH BIOPSY       Anesthesia Type:  MAC    Note:  Disposition: Outpatient   Postop Pain Control: Uneventful            Sign Out: Well controlled pain   PONV: No   Neuro/Psych: Uneventful            Sign Out: Acceptable/Baseline neuro status   Airway/Respiratory: Uneventful            Sign Out: Acceptable/Baseline resp. status   CV/Hemodynamics: Uneventful            Sign Out: Acceptable CV status; No obvious hypovolemia; No obvious fluid overload   Other NRE: NONE   DID A NON-ROUTINE EVENT OCCUR? No           Last vitals:  Vitals Value Taken Time   /69 07/15/25 13:00   Temp 36.4  C (97.6  F) 07/15/25 12:33   Pulse 78 07/15/25 13:00   Resp 15 07/15/25 12:50   SpO2 96 % 07/15/25 13:07   Vitals shown include unfiled device data.    Electronically Signed By: Edi Elise MD  July 15, 2025  1:24 PM

## 2025-07-15 NOTE — ANESTHESIA CARE TRANSFER NOTE
Patient: Gerson De Jesus    Procedure: Procedure(s):  ESOPHAGOGASTRODUODENOSCOPY, WITH BIOPSY       Diagnosis: Gastroesophageal reflux disease without esophagitis [K21.9]  Diagnosis Additional Information: No value filed.    Anesthesia Type:   MAC     Note:    Oropharynx: oropharynx clear of all foreign objects and spontaneously breathing  Level of Consciousness: awake  Oxygen Supplementation: room air    Independent Airway: airway patency satisfactory and stable  Dentition: dentition unchanged  Vital Signs Stable: post-procedure vital signs reviewed and stable  Report to RN Given: handoff report given  Patient transferred to: Phase II  Comments: Report to Phase II RN  Handoff Report: Identifed the Patient, Identified the Reponsible Provider, Reviewed the pertinent medical history, Discussed the surgical course, Reviewed Intra-OP anesthesia mangement and issues during anesthesia, Set expectations for post-procedure period and Allowed opportunity for questions and acknowledgement of understanding      Vitals:  Vitals Value Taken Time   /69 07/15/25 12:33   Temp     Pulse 94 07/15/25 12:33   Resp     SpO2 94 % 07/15/25 12:35   Vitals shown include unfiled device data.    Electronically Signed By: VENTURA Montero CRNA  July 15, 2025  12:36 PM

## 2025-07-16 LAB
PATH REPORT.COMMENTS IMP SPEC: NORMAL
PATH REPORT.COMMENTS IMP SPEC: NORMAL
PATH REPORT.FINAL DX SPEC: NORMAL
PATH REPORT.GROSS SPEC: NORMAL
PATH REPORT.MICROSCOPIC SPEC OTHER STN: NORMAL
PATH REPORT.RELEVANT HX SPEC: NORMAL
PHOTO IMAGE: NORMAL

## (undated) DEVICE — ENDO BITE BLOCK ADULT OMNI-BLOC

## (undated) DEVICE — SUCTION MANIFOLD NEPTUNE 2 SYS 1 PORT 702-025-000

## (undated) DEVICE — KIT ENDO TURNOVER/PROCEDURE CARRY-ON 101822

## (undated) DEVICE — ENDO TRAP POLYP E-TRAP 00711099

## (undated) DEVICE — SPECIMEN CONTAINER 3OZ W/FORMALIN 59901

## (undated) DEVICE — SOL WATER IRRIG 500ML BOTTLE 2F7113

## (undated) DEVICE — GOWN IMPERVIOUS 2XL BLUE

## (undated) DEVICE — TUBING SUCTION 10'X3/16" N510

## (undated) DEVICE — SYR 50ML SLIP TIP W/O NDL 309654

## (undated) DEVICE — ENDO FORCEP BX CAPTURA PRO SPIKE G50696

## (undated) DEVICE — GOWN ISOLATION YELLOW UNIV NOT STERILE 3110PG

## (undated) DEVICE — GLOVE EXAM NITRILE LG PF LATEX FREE 5064

## (undated) DEVICE — TUBING SUCTION MEDI-VAC 1/4"X20' N620A

## (undated) RX ORDER — FENTANYL CITRATE 50 UG/ML
INJECTION, SOLUTION INTRAMUSCULAR; INTRAVENOUS
Status: DISPENSED
Start: 2025-04-21